# Patient Record
Sex: MALE | Race: BLACK OR AFRICAN AMERICAN | NOT HISPANIC OR LATINO | Employment: FULL TIME | URBAN - METROPOLITAN AREA
[De-identification: names, ages, dates, MRNs, and addresses within clinical notes are randomized per-mention and may not be internally consistent; named-entity substitution may affect disease eponyms.]

---

## 2023-03-02 PROBLEM — E29.1 HYPOGONADISM MALE: Status: ACTIVE | Noted: 2023-03-02

## 2023-03-02 PROBLEM — C61 CANCER OF PROSTATE (MULTI): Status: ACTIVE | Noted: 2023-03-02

## 2023-03-02 PROBLEM — R07.9 CHEST PAIN: Status: ACTIVE | Noted: 2023-03-02

## 2023-03-02 PROBLEM — H10.10 ALLERGIC CONJUNCTIVITIS: Status: ACTIVE | Noted: 2023-03-02

## 2023-03-02 PROBLEM — D17.30 LIPOMA OF SKIN AND SUBCUTANEOUS TISSUE: Status: ACTIVE | Noted: 2023-03-02

## 2023-03-02 PROBLEM — J30.1 HAY FEVER: Status: ACTIVE | Noted: 2023-03-02

## 2023-03-02 PROBLEM — R59.9 ENLARGEMENT OF LYMPH NODE: Status: ACTIVE | Noted: 2023-03-02

## 2023-03-02 PROBLEM — M54.50 LOWER BACK PAIN: Status: ACTIVE | Noted: 2023-03-02

## 2023-03-02 PROBLEM — N52.9 ED (ERECTILE DYSFUNCTION): Status: ACTIVE | Noted: 2023-03-02

## 2023-03-02 PROBLEM — H01.009 BLEPHARITIS: Status: ACTIVE | Noted: 2023-03-02

## 2023-03-02 PROBLEM — E55.9 VITAMIN D DEFICIENCY: Status: ACTIVE | Noted: 2023-03-02

## 2023-03-02 PROBLEM — J06.9 URTI (ACUTE UPPER RESPIRATORY INFECTION): Status: ACTIVE | Noted: 2023-03-02

## 2023-03-02 PROBLEM — J30.9 ALLERGIC RHINITIS: Status: ACTIVE | Noted: 2023-03-02

## 2023-03-02 PROBLEM — R39.13 INTERMITTENT URINARY STREAM: Status: ACTIVE | Noted: 2023-03-02

## 2023-03-02 PROBLEM — N40.1 BENIGN LOCALIZED PROSTATIC HYPERPLASIA WITH LOWER URINARY TRACT SYMPTOMS (LUTS): Status: ACTIVE | Noted: 2023-03-02

## 2023-03-02 PROBLEM — D12.6 TUBULAR ADENOMA OF COLON: Status: ACTIVE | Noted: 2023-03-02

## 2023-03-02 PROBLEM — I10 BENIGN ESSENTIAL HYPERTENSION: Status: ACTIVE | Noted: 2023-03-02

## 2023-03-02 PROBLEM — E78.5 HYPERLIPIDEMIA: Status: ACTIVE | Noted: 2023-03-02

## 2023-03-02 PROBLEM — N52.9 INABILITY TO ATTAIN ERECTION: Status: ACTIVE | Noted: 2023-03-02

## 2023-03-02 PROBLEM — K30 PERSISTENT INDIGESTION: Status: ACTIVE | Noted: 2023-03-02

## 2023-03-02 PROBLEM — E11.9 TYPE 2 DIABETES MELLITUS (MULTI): Status: ACTIVE | Noted: 2023-03-02

## 2023-03-02 PROBLEM — M54.9 ACUTE MIDLINE BACK PAIN: Status: ACTIVE | Noted: 2023-03-02

## 2023-03-02 PROBLEM — R35.0 INCREASED FREQUENCY OF URINATION: Status: ACTIVE | Noted: 2023-03-02

## 2023-03-02 RX ORDER — TAMSULOSIN HYDROCHLORIDE 0.4 MG/1
0.4 CAPSULE ORAL SEE ADMIN INSTRUCTIONS
COMMUNITY
Start: 2019-05-24 | End: 2023-11-29 | Stop reason: SDUPTHER

## 2023-03-02 RX ORDER — SILDENAFIL 100 MG/1
100 TABLET, FILM COATED ORAL AS NEEDED
COMMUNITY
Start: 2019-12-09 | End: 2024-03-18 | Stop reason: ALTCHOICE

## 2023-03-02 RX ORDER — LOSARTAN POTASSIUM 25 MG/1
1 TABLET ORAL DAILY
COMMUNITY
Start: 2018-08-23 | End: 2023-03-28

## 2023-03-02 RX ORDER — ASPIRIN 81 MG/1
1 TABLET ORAL DAILY
COMMUNITY
Start: 2018-09-27 | End: 2024-03-13 | Stop reason: SDUPTHER

## 2023-03-02 RX ORDER — METFORMIN HYDROCHLORIDE 500 MG/1
500 TABLET, EXTENDED RELEASE ORAL SEE ADMIN INSTRUCTIONS
COMMUNITY
Start: 2015-02-20 | End: 2023-11-29 | Stop reason: SDUPTHER

## 2023-03-02 RX ORDER — AZELASTINE 1 MG/ML
2 SPRAY, METERED NASAL 2 TIMES DAILY
COMMUNITY
Start: 2020-04-10 | End: 2023-11-29 | Stop reason: SDUPTHER

## 2023-03-02 RX ORDER — MINERAL OIL
1 ENEMA (ML) RECTAL DAILY PRN
COMMUNITY
Start: 2022-04-06 | End: 2023-11-29 | Stop reason: WASHOUT

## 2023-03-02 RX ORDER — SIMVASTATIN 20 MG/1
1 TABLET, FILM COATED ORAL DAILY
COMMUNITY
Start: 2012-02-29 | End: 2023-03-10

## 2023-03-02 RX ORDER — NAPROXEN 500 MG/1
500 TABLET ORAL
COMMUNITY
Start: 2013-08-22 | End: 2023-08-11 | Stop reason: SDUPTHER

## 2023-03-02 RX ORDER — CHOLECALCIFEROL (VITAMIN D3) 50 MCG
1 TABLET ORAL DAILY
COMMUNITY
Start: 2021-04-14 | End: 2023-10-23 | Stop reason: SDUPTHER

## 2023-03-09 LAB
PROSTATE SPECIFIC AG (NG/ML) IN SER/PLAS: 0.53 NG/ML (ref 0–4)
TESTOSTERONE (NG/DL) IN SER/PLAS: 178 NG/DL (ref 240–1000)

## 2023-03-10 DIAGNOSIS — E78.5 HYPERLIPIDEMIA, UNSPECIFIED HYPERLIPIDEMIA TYPE: Primary | ICD-10-CM

## 2023-03-10 RX ORDER — SIMVASTATIN 20 MG/1
TABLET, FILM COATED ORAL
Qty: 90 TABLET | Refills: 0 | Status: SHIPPED | OUTPATIENT
Start: 2023-03-10 | End: 2023-06-08

## 2023-03-28 DIAGNOSIS — I10 BENIGN ESSENTIAL HYPERTENSION: Primary | ICD-10-CM

## 2023-03-28 RX ORDER — LOSARTAN POTASSIUM 25 MG/1
TABLET ORAL
Qty: 90 TABLET | Refills: 3 | Status: SHIPPED | OUTPATIENT
Start: 2023-03-28 | End: 2023-11-29 | Stop reason: SDUPTHER

## 2023-04-28 ENCOUNTER — OFFICE VISIT (OUTPATIENT)
Dept: PRIMARY CARE | Facility: CLINIC | Age: 68
End: 2023-04-28
Payer: MEDICARE

## 2023-04-28 VITALS
WEIGHT: 285 LBS | BODY MASS INDEX: 37.64 KG/M2 | SYSTOLIC BLOOD PRESSURE: 122 MMHG | DIASTOLIC BLOOD PRESSURE: 80 MMHG | TEMPERATURE: 96.5 F

## 2023-04-28 DIAGNOSIS — E11.9 TYPE 2 DIABETES MELLITUS WITHOUT COMPLICATION, WITHOUT LONG-TERM CURRENT USE OF INSULIN (MULTI): ICD-10-CM

## 2023-04-28 DIAGNOSIS — K30 PERSISTENT INDIGESTION: ICD-10-CM

## 2023-04-28 DIAGNOSIS — Z00.00 ROUTINE GENERAL MEDICAL EXAMINATION AT HEALTH CARE FACILITY: Primary | ICD-10-CM

## 2023-04-28 DIAGNOSIS — I10 BENIGN ESSENTIAL HYPERTENSION: ICD-10-CM

## 2023-04-28 DIAGNOSIS — E55.9 VITAMIN D DEFICIENCY: ICD-10-CM

## 2023-04-28 PROCEDURE — 3079F DIAST BP 80-89 MM HG: CPT | Performed by: INTERNAL MEDICINE

## 2023-04-28 PROCEDURE — 1159F MED LIST DOCD IN RCRD: CPT | Performed by: INTERNAL MEDICINE

## 2023-04-28 PROCEDURE — 3074F SYST BP LT 130 MM HG: CPT | Performed by: INTERNAL MEDICINE

## 2023-04-28 PROCEDURE — 1160F RVW MEDS BY RX/DR IN RCRD: CPT | Performed by: INTERNAL MEDICINE

## 2023-04-28 PROCEDURE — 99214 OFFICE O/P EST MOD 30 MIN: CPT | Performed by: INTERNAL MEDICINE

## 2023-04-28 PROCEDURE — 1036F TOBACCO NON-USER: CPT | Performed by: INTERNAL MEDICINE

## 2023-04-28 PROCEDURE — G0439 PPPS, SUBSEQ VISIT: HCPCS | Performed by: INTERNAL MEDICINE

## 2023-04-28 PROCEDURE — 4010F ACE/ARB THERAPY RXD/TAKEN: CPT | Performed by: INTERNAL MEDICINE

## 2023-04-28 ASSESSMENT — PATIENT HEALTH QUESTIONNAIRE - PHQ9
1. LITTLE INTEREST OR PLEASURE IN DOING THINGS: NOT AT ALL
2. FEELING DOWN, DEPRESSED OR HOPELESS: NOT AT ALL
SUM OF ALL RESPONSES TO PHQ9 QUESTIONS 1 AND 2: 0

## 2023-04-28 ASSESSMENT — ENCOUNTER SYMPTOMS
CONSTITUTIONAL NEGATIVE: 1
GASTROINTESTINAL NEGATIVE: 1
RESPIRATORY NEGATIVE: 1
CARDIOVASCULAR NEGATIVE: 1

## 2023-04-28 NOTE — PROGRESS NOTES
Subjective   Reason for Visit: Brayden George is an 67 y.o. male here for a Medicare Wellness visit.   The patient is being seen for the subsequent annual wellness visit and follow up.  Past Medical, Surgical and Family History: reviewed and updated in chart.   Interval History: Patient has not been hospitalized previously.   Medications and Supplements: Review of all medications by a prescribing practitioner or clinical pharmacist (such as prescriptions, OTCs, herbal therapies and supplements) documented in the medical record.    No, the patient is not using opioids.   Health Risk Assessment:. Paper HRA completed by patient and scanned into chart.   Depression/Suicide Screening:  .   Done  No falls in the past year.  No recent hospitalizations.  Advance care planning completed.              HPI    Patient Care Team:  Darlene Booth MD as PCP - General  Darlene Booth MD as PCP - [employer] PCP     Review of Systems   Constitutional: Negative.    Respiratory: Negative.     Cardiovascular: Negative.    Gastrointestinal: Negative.        Objective   Vitals:  /80   Temp 35.8 °C (96.5 °F)   Wt 129 kg (285 lb)   BMI 37.64 kg/m²       Physical Exam  Constitutional:       Appearance: He is well-developed.   Cardiovascular:      Rate and Rhythm: Normal rate and regular rhythm.      Heart sounds: Normal heart sounds. No murmur heard.  Pulmonary:      Effort: Pulmonary effort is normal.      Breath sounds: Normal breath sounds.   Abdominal:      General: Bowel sounds are normal.      Palpations: Abdomen is soft.         Assessment/Plan   Problem List Items Addressed This Visit          Circulatory    Benign essential hypertension    Relevant Orders    CBC    Comprehensive Metabolic Panel    Lipid Panel    Hemoglobin A1C    TSH with reflex to Free T4 if abnormal    Vitamin D, Total    Albumin , Urine Random       Digestive    Persistent indigestion    Relevant Orders    CBC    Comprehensive  Metabolic Panel    Lipid Panel    Hemoglobin A1C    TSH with reflex to Free T4 if abnormal    Vitamin D, Total    Albumin , Urine Random       Endocrine/Metabolic    Type 2 diabetes mellitus (CMS/HCC)    Relevant Orders    CBC    Comprehensive Metabolic Panel    Lipid Panel    Hemoglobin A1C    TSH with reflex to Free T4 if abnormal    Vitamin D, Total    Albumin , Urine Random    Vitamin D deficiency    Relevant Orders    CBC    Comprehensive Metabolic Panel    Lipid Panel    Hemoglobin A1C    TSH with reflex to Free T4 if abnormal    Vitamin D, Total    Albumin , Urine Random     Other Visit Diagnoses       Routine general medical examination at health care facility    -  Primary             HTN.  Well controlled.  Continue with current medications.  Check BP at home daily.  Report to us if the numbers are higher than 130/80.     Diabetes Mellitus type II, under good  control.  1. Rx changes none  2. Education: Reviewed ‘ABCs’ of diabetes management (respective goals in parentheses):  A1C (<7), blood pressure (<130/80), and cholesterol (LDL <100).  3. Compliance at present is estimated to be good. Efforts to improve compliance were discussed.  4. Follow up in 3 months        HLD  Stable  Continue with statins  Check lipids  Lab Results   Component Value Date    WBC 4.5 10/14/2022    HGB 13.3 (L) 10/14/2022    HCT 38.5 (L) 10/14/2022     10/14/2022    CHOL 125 10/14/2022    TRIG 63 10/14/2022    HDL 53.2 10/14/2022    ALT 15 10/14/2022    AST 13 10/14/2022     10/14/2022    K 4.0 10/14/2022     10/14/2022    CREATININE 0.81 10/14/2022    BUN 10 10/14/2022    CO2 29 10/14/2022    TSH 2.05 04/13/2021    PSA 0.53 03/09/2023    HGBA1C 7.3 (A) 04/04/2022     par  MDM  1) COMPLEXITY: MORE THAN 1 STABLE CHRONIC CONDITION ADDRESSED OR 1 ACUTE ILLNESS ADDRESSED   2)DATA: TESTS INTERPRETED AND OR ORDERED, TOOK INDEPENDENT HISTORY OR RECORDS REVIEWED  3)RISK: MODERATE RISK DUE TO NATURE OF MEDICAL  CONDITIONS/COMORBIDITY OR MEDICATIONS ORDERED OR SURGICAL OR PROCEDURE REFERRAL

## 2023-05-15 ENCOUNTER — TELEMEDICINE (OUTPATIENT)
Dept: PRIMARY CARE | Facility: CLINIC | Age: 68
End: 2023-05-15
Payer: MEDICARE

## 2023-05-15 DIAGNOSIS — J32.9 SINUSITIS, UNSPECIFIED CHRONICITY, UNSPECIFIED LOCATION: Primary | ICD-10-CM

## 2023-05-15 PROCEDURE — 99213 OFFICE O/P EST LOW 20 MIN: CPT | Performed by: INTERNAL MEDICINE

## 2023-05-15 RX ORDER — AMOXICILLIN AND CLAVULANATE POTASSIUM 500; 125 MG/1; MG/1
500 TABLET, FILM COATED ORAL 2 TIMES DAILY
Qty: 20 TABLET | Refills: 0 | Status: SHIPPED | OUTPATIENT
Start: 2023-05-15 | End: 2023-05-25

## 2023-05-15 ASSESSMENT — ENCOUNTER SYMPTOMS
SORE THROAT: 1
SINUS PAIN: 1
DIARRHEA: 0
COUGH: 1

## 2023-05-15 NOTE — PROGRESS NOTES
Subjective   Patient ID: Brayden George is a 67 y.o. male who presents for URI.  URI   The current episode started in the past 7 days. The problem has been unchanged. There has been no fever. Associated symptoms include congestion, coughing, sinus pain and a sore throat. Pertinent negatives include no chest pain or diarrhea. He has tried acetaminophen and increased fluids for the symptoms. The treatment provided mild relief.       Review of Systems   HENT:  Positive for congestion, sinus pain and sore throat.    Respiratory:  Positive for cough.    Cardiovascular:  Negative for chest pain.   Gastrointestinal:  Negative for diarrhea.       Objective   Physical Exam  Neurological:      Mental Status: He is alert.   Psychiatric:         Mood and Affect: Mood normal.         Assessment/Plan   Problem List Items Addressed This Visit          Infectious/Inflammatory    Sinusitis - Primary          Acute sinusitis.  Symptoms persist for longer than 1 week.  Has purulent nasal discharge and other signs of acute bacterial sinusitis.  Recommend antibiotics, saline sinus rinses, PRN Ibuprofen and Tylenol.  Let us know if not better in 1 week.  An interactive audio and video telecommunication system which permits real time communications between the patient (at the originating site) and provider (at the distant site) was utilized to provide this telehealth service.    Verbal consent was requested and obtained from the patient on the day of encounter.      Darlene Booth MD

## 2023-06-08 DIAGNOSIS — E78.5 HYPERLIPIDEMIA, UNSPECIFIED HYPERLIPIDEMIA TYPE: ICD-10-CM

## 2023-06-08 RX ORDER — SIMVASTATIN 20 MG/1
TABLET, FILM COATED ORAL
Qty: 90 TABLET | Refills: 3 | Status: SHIPPED | OUTPATIENT
Start: 2023-06-08 | End: 2023-11-29 | Stop reason: SDUPTHER

## 2023-06-28 ENCOUNTER — OFFICE VISIT (OUTPATIENT)
Dept: PRIMARY CARE | Facility: CLINIC | Age: 68
End: 2023-06-28
Payer: MEDICARE

## 2023-06-28 VITALS
HEIGHT: 73 IN | WEIGHT: 287 LBS | SYSTOLIC BLOOD PRESSURE: 120 MMHG | BODY MASS INDEX: 38.04 KG/M2 | DIASTOLIC BLOOD PRESSURE: 60 MMHG

## 2023-06-28 DIAGNOSIS — M54.2 NECK PAIN ON LEFT SIDE: Primary | ICD-10-CM

## 2023-06-28 PROCEDURE — 3078F DIAST BP <80 MM HG: CPT

## 2023-06-28 PROCEDURE — 99213 OFFICE O/P EST LOW 20 MIN: CPT

## 2023-06-28 PROCEDURE — 3074F SYST BP LT 130 MM HG: CPT

## 2023-06-28 PROCEDURE — 1036F TOBACCO NON-USER: CPT

## 2023-06-28 PROCEDURE — 1160F RVW MEDS BY RX/DR IN RCRD: CPT

## 2023-06-28 PROCEDURE — 4010F ACE/ARB THERAPY RXD/TAKEN: CPT

## 2023-06-28 PROCEDURE — 1159F MED LIST DOCD IN RCRD: CPT

## 2023-06-28 RX ORDER — OMEGA-3-ACID ETHYL ESTERS 1 G/1
2 CAPSULE, LIQUID FILLED ORAL 2 TIMES DAILY
COMMUNITY
Start: 2012-02-29 | End: 2023-11-29 | Stop reason: SDUPTHER

## 2023-06-28 RX ORDER — CLINDAMYCIN HYDROCHLORIDE 300 MG/1
300 CAPSULE ORAL 3 TIMES DAILY
Qty: 21 CAPSULE | Refills: 0 | Status: SHIPPED | OUTPATIENT
Start: 2023-06-28 | End: 2023-07-05

## 2023-06-28 RX ORDER — DICLOFENAC SODIUM 10 MG/G
4 GEL TOPICAL DAILY
COMMUNITY
Start: 2022-10-14 | End: 2023-11-29 | Stop reason: SDUPTHER

## 2023-06-28 ASSESSMENT — PATIENT HEALTH QUESTIONNAIRE - PHQ9
1. LITTLE INTEREST OR PLEASURE IN DOING THINGS: NOT AT ALL
SUM OF ALL RESPONSES TO PHQ9 QUESTIONS 1 AND 2: 0
2. FEELING DOWN, DEPRESSED OR HOPELESS: NOT AT ALL

## 2023-06-28 ASSESSMENT — LIFESTYLE VARIABLES
HOW OFTEN DO YOU HAVE SIX OR MORE DRINKS ON ONE OCCASION: NEVER
HOW OFTEN DO YOU HAVE A DRINK CONTAINING ALCOHOL: MONTHLY OR LESS

## 2023-06-28 NOTE — PROGRESS NOTES
"Subjective   Patient ID: Brayden George is a 67 y.o. male who presents for ENT ref requested (Seen [facility] 2 weeks ago for given antibiotic).  HPI  65yo M with pmhx of HTN, DMII, prostate cancer s/p ADT and radiation who presents today for ENT referral.     Reports that he went to urgent care 2 weeks ago for sore throat, negative strep and COVID test, was given rx for augmentin, which he took the full course of. Symptoms improved but continues to have pain in the left side of his neck. Worse at night. Not worse with swallowing. Worse with moving jaw.     Has had many dental problems, has not seen a dentist in many years.     No chest pain, SOB, dizziness, nasal congestion, or cough.     All systems have been reviewed and are negative for complaint other than those mentioned in the HPI.     Objective   /60 (BP Location: Left arm, Patient Position: Sitting, BP Cuff Size: Large adult)   Ht 1.854 m (6' 1\")   Wt 130 kg (287 lb)   BMI 37.87 kg/m²    Physical Exam  Constitutional:       General: He is awake.      Appearance: Normal appearance.   HENT:      Head: Normocephalic and atraumatic.   Eyes:      Extraocular Movements: Extraocular movements intact.      Pupils: Pupils are equal, round, and reactive to light.   Cardiovascular:      Rate and Rhythm: Normal rate and regular rhythm.      Heart sounds: S1 normal and S2 normal. No murmur heard.  Pulmonary:      Effort: Pulmonary effort is normal.      Breath sounds: Normal breath sounds.   Musculoskeletal:      Cervical back: Normal range of motion and neck supple.      Right lower leg: No edema.      Left lower leg: No edema.   Skin:     General: Skin is warm and dry.   Neurological:      General: No focal deficit present.      Mental Status: He is alert and oriented to person, place, and time.   Psychiatric:         Mood and Affect: Mood and affect normal.         Behavior: Behavior normal. Behavior is cooperative.         Thought Content: Thought content normal.    "      Judgment: Judgment normal.     Brayden was seen today for ent ref requested.  Diagnoses and all orders for this visit:  Neck pain on left side (Primary)  -     Visible dental carries on teeth of left lower jaw.   - Pain possibly due to dental carries, recommend follow up with dentist. Will treat with clindamycin as well in interim.   - Inflamed lymph node palpated on left subclavicular. Likely in setting of infection, if it does not resolve, recommend follow up for ultrasound.   - Patient requesting referral to ENT, provided. Referral to ENT; Future  -     clindamycin (Cleocin) 300 mg capsule; Take 1 capsule (300 mg) by mouth 3 times a day for 7 days.    Follow up as regularly scheduled for chronic care conditions with PCP.

## 2023-08-10 ENCOUNTER — OFFICE VISIT (OUTPATIENT)
Dept: PRIMARY CARE | Facility: CLINIC | Age: 68
End: 2023-08-10
Payer: MEDICARE

## 2023-08-10 ENCOUNTER — LAB (OUTPATIENT)
Dept: LAB | Facility: LAB | Age: 68
End: 2023-08-10
Payer: MEDICARE

## 2023-08-10 VITALS — WEIGHT: 287 LBS | DIASTOLIC BLOOD PRESSURE: 80 MMHG | SYSTOLIC BLOOD PRESSURE: 120 MMHG | BODY MASS INDEX: 37.87 KG/M2

## 2023-08-10 DIAGNOSIS — E78.5 HYPERLIPIDEMIA, UNSPECIFIED HYPERLIPIDEMIA TYPE: ICD-10-CM

## 2023-08-10 DIAGNOSIS — C61 CANCER OF PROSTATE (MULTI): ICD-10-CM

## 2023-08-10 DIAGNOSIS — E55.9 VITAMIN D DEFICIENCY: ICD-10-CM

## 2023-08-10 DIAGNOSIS — I10 BENIGN ESSENTIAL HYPERTENSION: Primary | ICD-10-CM

## 2023-08-10 DIAGNOSIS — E11.9 TYPE 2 DIABETES MELLITUS WITHOUT COMPLICATION, WITHOUT LONG-TERM CURRENT USE OF INSULIN (MULTI): ICD-10-CM

## 2023-08-10 DIAGNOSIS — I10 BENIGN ESSENTIAL HYPERTENSION: ICD-10-CM

## 2023-08-10 DIAGNOSIS — Z00.00 ANNUAL PHYSICAL EXAM: ICD-10-CM

## 2023-08-10 LAB
ALANINE AMINOTRANSFERASE (SGPT) (U/L) IN SER/PLAS: 18 U/L (ref 10–52)
ALBUMIN (G/DL) IN SER/PLAS: 4.2 G/DL (ref 3.4–5)
ALBUMIN (MG/L) IN URINE: <7 MG/L
ALBUMIN/CREATININE (UG/MG) IN URINE: NORMAL UG/MG CRT (ref 0–30)
ALKALINE PHOSPHATASE (U/L) IN SER/PLAS: 63 U/L (ref 33–136)
ANION GAP IN SER/PLAS: 12 MMOL/L (ref 10–20)
ASPARTATE AMINOTRANSFERASE (SGOT) (U/L) IN SER/PLAS: 16 U/L (ref 9–39)
BILIRUBIN TOTAL (MG/DL) IN SER/PLAS: 0.4 MG/DL (ref 0–1.2)
CALCIDIOL (25 OH VITAMIN D3) (NG/ML) IN SER/PLAS: 28 NG/ML
CALCIUM (MG/DL) IN SER/PLAS: 9.6 MG/DL (ref 8.6–10.6)
CARBON DIOXIDE, TOTAL (MMOL/L) IN SER/PLAS: 28 MMOL/L (ref 21–32)
CHLORIDE (MMOL/L) IN SER/PLAS: 105 MMOL/L (ref 98–107)
CHOLESTEROL (MG/DL) IN SER/PLAS: 142 MG/DL (ref 0–199)
CHOLESTEROL IN HDL (MG/DL) IN SER/PLAS: 57.4 MG/DL
CHOLESTEROL/HDL RATIO: 2.5
CREATININE (MG/DL) IN SER/PLAS: 0.89 MG/DL (ref 0.5–1.3)
CREATININE (MG/DL) IN URINE: 109 MG/DL (ref 20–370)
ERYTHROCYTE DISTRIBUTION WIDTH (RATIO) BY AUTOMATED COUNT: 12.9 % (ref 11.5–14.5)
ERYTHROCYTE MEAN CORPUSCULAR HEMOGLOBIN CONCENTRATION (G/DL) BY AUTOMATED: 33.2 G/DL (ref 32–36)
ERYTHROCYTE MEAN CORPUSCULAR VOLUME (FL) BY AUTOMATED COUNT: 88 FL (ref 80–100)
ERYTHROCYTES (10*6/UL) IN BLOOD BY AUTOMATED COUNT: 4.43 X10E12/L (ref 4.5–5.9)
ESTIMATED AVERAGE GLUCOSE FOR HBA1C: 151 MG/DL
GFR MALE: >90 ML/MIN/1.73M2
GLUCOSE (MG/DL) IN SER/PLAS: 138 MG/DL (ref 74–99)
HEMATOCRIT (%) IN BLOOD BY AUTOMATED COUNT: 38.8 % (ref 41–52)
HEMOGLOBIN (G/DL) IN BLOOD: 12.9 G/DL (ref 13.5–17.5)
HEMOGLOBIN A1C/HEMOGLOBIN TOTAL IN BLOOD: 6.9 %
HEPATITIS C VIRUS AB PRESENCE IN SERUM: NONREACTIVE
LDL: 67 MG/DL (ref 0–99)
LEUKOCYTES (10*3/UL) IN BLOOD BY AUTOMATED COUNT: 4.6 X10E9/L (ref 4.4–11.3)
NRBC (PER 100 WBCS) BY AUTOMATED COUNT: 0 /100 WBC (ref 0–0)
PLATELETS (10*3/UL) IN BLOOD AUTOMATED COUNT: 162 X10E9/L (ref 150–450)
POTASSIUM (MMOL/L) IN SER/PLAS: 3.9 MMOL/L (ref 3.5–5.3)
PROTEIN TOTAL: 7.1 G/DL (ref 6.4–8.2)
SODIUM (MMOL/L) IN SER/PLAS: 141 MMOL/L (ref 136–145)
THYROTROPIN (MIU/L) IN SER/PLAS BY DETECTION LIMIT <= 0.05 MIU/L: 1.77 MIU/L (ref 0.44–3.98)
TRIGLYCERIDE (MG/DL) IN SER/PLAS: 88 MG/DL (ref 0–149)
UREA NITROGEN (MG/DL) IN SER/PLAS: 12 MG/DL (ref 6–23)
VLDL: 18 MG/DL (ref 0–40)

## 2023-08-10 PROCEDURE — 82043 UR ALBUMIN QUANTITATIVE: CPT

## 2023-08-10 PROCEDURE — 3074F SYST BP LT 130 MM HG: CPT | Performed by: INTERNAL MEDICINE

## 2023-08-10 PROCEDURE — 86803 HEPATITIS C AB TEST: CPT

## 2023-08-10 PROCEDURE — 80061 LIPID PANEL: CPT

## 2023-08-10 PROCEDURE — 83036 HEMOGLOBIN GLYCOSYLATED A1C: CPT

## 2023-08-10 PROCEDURE — 36415 COLL VENOUS BLD VENIPUNCTURE: CPT

## 2023-08-10 PROCEDURE — 82306 VITAMIN D 25 HYDROXY: CPT

## 2023-08-10 PROCEDURE — 80053 COMPREHEN METABOLIC PANEL: CPT

## 2023-08-10 PROCEDURE — 1159F MED LIST DOCD IN RCRD: CPT | Performed by: INTERNAL MEDICINE

## 2023-08-10 PROCEDURE — 1160F RVW MEDS BY RX/DR IN RCRD: CPT | Performed by: INTERNAL MEDICINE

## 2023-08-10 PROCEDURE — 84443 ASSAY THYROID STIM HORMONE: CPT

## 2023-08-10 PROCEDURE — 85027 COMPLETE CBC AUTOMATED: CPT

## 2023-08-10 PROCEDURE — 99214 OFFICE O/P EST MOD 30 MIN: CPT | Performed by: INTERNAL MEDICINE

## 2023-08-10 PROCEDURE — 1126F AMNT PAIN NOTED NONE PRSNT: CPT | Performed by: INTERNAL MEDICINE

## 2023-08-10 PROCEDURE — 82570 ASSAY OF URINE CREATININE: CPT

## 2023-08-10 PROCEDURE — 3079F DIAST BP 80-89 MM HG: CPT | Performed by: INTERNAL MEDICINE

## 2023-08-10 PROCEDURE — 1036F TOBACCO NON-USER: CPT | Performed by: INTERNAL MEDICINE

## 2023-08-10 PROCEDURE — 3008F BODY MASS INDEX DOCD: CPT | Performed by: INTERNAL MEDICINE

## 2023-08-10 PROCEDURE — 4010F ACE/ARB THERAPY RXD/TAKEN: CPT | Performed by: INTERNAL MEDICINE

## 2023-08-10 ASSESSMENT — ENCOUNTER SYMPTOMS
CONSTITUTIONAL NEGATIVE: 1
CARDIOVASCULAR NEGATIVE: 1
RESPIRATORY NEGATIVE: 1
GASTROINTESTINAL NEGATIVE: 1

## 2023-08-10 NOTE — PROGRESS NOTES
Subjective   Patient ID: Brayden George is a 67 y.o. male who presents for Follow-up.  HPI    Review of Systems   Constitutional: Negative.    Respiratory: Negative.     Cardiovascular: Negative.    Gastrointestinal: Negative.        Objective   Physical Exam  Neurological:      Mental Status: He is alert.   Psychiatric:         Mood and Affect: Mood normal.         Assessment/Plan   Problem List Items Addressed This Visit       Type 2 diabetes mellitus (CMS/HCC)    Relevant Orders    CBC    Comprehensive Metabolic Panel    TSH with reflex to Free T4 if abnormal    Hemoglobin A1C    Lipid Panel    Vitamin D, Total    Albumin, urine, random    Benign essential hypertension - Primary    Relevant Orders    CBC    Comprehensive Metabolic Panel    TSH with reflex to Free T4 if abnormal    Hemoglobin A1C    Lipid Panel    Vitamin D, Total    Albumin, urine, random    Hyperlipidemia    Relevant Orders    CBC    Comprehensive Metabolic Panel    TSH with reflex to Free T4 if abnormal    Hemoglobin A1C    Lipid Panel    Vitamin D, Total    Albumin, urine, random    Vitamin D deficiency    Relevant Orders    CBC    Comprehensive Metabolic Panel    TSH with reflex to Free T4 if abnormal    Hemoglobin A1C    Lipid Panel    Vitamin D, Total    Albumin, urine, random     Other Visit Diagnoses       Annual physical exam        Relevant Orders    Hepatitis C antibody          The patient is here for a follow up on chronic medical problems.  His medications were reviewed, pharmacy updated, notes reviewed, prior labs reviewed.       HTN.  Well controlled.  Continue with current medications.  Check BP at home daily.  Report to us if the numbers are higher than 130/80.       Diabetes Mellitus type II, under good  control.  1. Rx changes none  2. Education: Reviewed ‘ABCs’ of diabetes management (respective goals in parentheses):  A1C (<7), blood pressure (<130/80), and cholesterol (LDL <100).  3. Compliance at present is estimated to be  good. Efforts to improve compliance were discussed.  4. Follow up in 3 months    I would like to start him on GLP receptor agonists  Due for repeat BW today  After that we will decide which one to start    Due for an eye exam        HLD  Stable  Continue with statins  Check lipids           Darlene Booth MD

## 2023-08-11 DIAGNOSIS — E55.9 VITAMIN D DEFICIENCY: Primary | ICD-10-CM

## 2023-08-11 DIAGNOSIS — E11.9 TYPE 2 DIABETES MELLITUS WITHOUT COMPLICATION, WITHOUT LONG-TERM CURRENT USE OF INSULIN (MULTI): ICD-10-CM

## 2023-08-11 DIAGNOSIS — M54.40 ACUTE RIGHT-SIDED LOW BACK PAIN WITH SCIATICA, SCIATICA LATERALITY UNSPECIFIED: ICD-10-CM

## 2023-08-11 RX ORDER — NAPROXEN 500 MG/1
500 TABLET ORAL
Qty: 90 TABLET | Refills: 0 | Status: SHIPPED | OUTPATIENT
Start: 2023-08-11

## 2023-08-11 RX ORDER — DULAGLUTIDE 0.75 MG/.5ML
0.75 INJECTION, SOLUTION SUBCUTANEOUS
Qty: 4 EACH | Refills: 0 | Status: SHIPPED | OUTPATIENT
Start: 2023-08-11 | End: 2023-11-29 | Stop reason: WASHOUT

## 2023-08-11 RX ORDER — ACETAMINOPHEN 500 MG
50 TABLET ORAL DAILY
Qty: 90 CAPSULE | Refills: 0 | Status: SHIPPED | OUTPATIENT
Start: 2023-08-11 | End: 2023-10-23 | Stop reason: SDUPTHER

## 2023-08-11 RX ORDER — DULAGLUTIDE 1.5 MG/.5ML
1.5 INJECTION, SOLUTION SUBCUTANEOUS
Qty: 4 EACH | Refills: 2 | Status: SHIPPED | OUTPATIENT
Start: 2023-08-11 | End: 2023-11-29 | Stop reason: WASHOUT

## 2023-09-14 LAB
APPEARANCE, URINE: CLEAR
BILIRUBIN, URINE: NEGATIVE
BLOOD, URINE: NEGATIVE
COLOR, URINE: YELLOW
GLUCOSE, URINE: NEGATIVE MG/DL
KETONES, URINE: NEGATIVE MG/DL
LEUKOCYTE ESTERASE, URINE: NEGATIVE
NITRITE, URINE: NEGATIVE
PH, URINE: 5 (ref 5–8)
PROSTATE SPECIFIC AG (NG/ML) IN SER/PLAS: 0.35 NG/ML (ref 0–4)
PROTEIN, URINE: NEGATIVE MG/DL
SPECIFIC GRAVITY, URINE: 1.01 (ref 1–1.03)
TESTOSTERONE (NG/DL) IN SER/PLAS: 149 NG/DL (ref 240–1000)
UROBILINOGEN, URINE: <2 MG/DL (ref 0–1.9)

## 2023-10-23 DIAGNOSIS — E55.9 VITAMIN D DEFICIENCY: ICD-10-CM

## 2023-10-23 RX ORDER — ACETAMINOPHEN 500 MG
2000 TABLET ORAL DAILY
Qty: 90 CAPSULE | Refills: 0 | Status: SHIPPED | OUTPATIENT
Start: 2023-10-23 | End: 2023-10-30 | Stop reason: SDUPTHER

## 2023-10-30 DIAGNOSIS — E55.9 VITAMIN D DEFICIENCY: ICD-10-CM

## 2023-10-30 RX ORDER — ACETAMINOPHEN 500 MG
2000 TABLET ORAL DAILY
Qty: 90 CAPSULE | Refills: 1 | Status: SHIPPED | OUTPATIENT
Start: 2023-10-30 | End: 2023-11-29 | Stop reason: SDUPTHER

## 2023-11-10 ENCOUNTER — APPOINTMENT (OUTPATIENT)
Dept: PRIMARY CARE | Facility: CLINIC | Age: 68
End: 2023-11-10
Payer: MEDICARE

## 2023-11-29 ENCOUNTER — OFFICE VISIT (OUTPATIENT)
Dept: PRIMARY CARE | Facility: CLINIC | Age: 68
End: 2023-11-29
Payer: MEDICARE

## 2023-11-29 VITALS — SYSTOLIC BLOOD PRESSURE: 128 MMHG | WEIGHT: 283 LBS | BODY MASS INDEX: 37.34 KG/M2 | DIASTOLIC BLOOD PRESSURE: 74 MMHG

## 2023-11-29 DIAGNOSIS — E78.5 HYPERLIPIDEMIA, UNSPECIFIED HYPERLIPIDEMIA TYPE: ICD-10-CM

## 2023-11-29 DIAGNOSIS — Z23 FLU VACCINE NEED: ICD-10-CM

## 2023-11-29 DIAGNOSIS — E55.9 VITAMIN D DEFICIENCY: ICD-10-CM

## 2023-11-29 DIAGNOSIS — I10 BENIGN ESSENTIAL HYPERTENSION: ICD-10-CM

## 2023-11-29 DIAGNOSIS — T78.40XD ALLERGY, SUBSEQUENT ENCOUNTER: ICD-10-CM

## 2023-11-29 DIAGNOSIS — B35.1 ONYCHOMYCOSIS: Primary | ICD-10-CM

## 2023-11-29 DIAGNOSIS — E11.9 TYPE 2 DIABETES MELLITUS WITHOUT COMPLICATION, WITHOUT LONG-TERM CURRENT USE OF INSULIN (MULTI): ICD-10-CM

## 2023-11-29 DIAGNOSIS — R39.11 URINARY HESITANCY: ICD-10-CM

## 2023-11-29 LAB — POC HEMOGLOBIN A1C: 7.2 % (ref 4.2–6.5)

## 2023-11-29 PROCEDURE — 3078F DIAST BP <80 MM HG: CPT | Performed by: INTERNAL MEDICINE

## 2023-11-29 PROCEDURE — G0008 ADMIN INFLUENZA VIRUS VAC: HCPCS | Performed by: INTERNAL MEDICINE

## 2023-11-29 PROCEDURE — 1160F RVW MEDS BY RX/DR IN RCRD: CPT | Performed by: INTERNAL MEDICINE

## 2023-11-29 PROCEDURE — 1126F AMNT PAIN NOTED NONE PRSNT: CPT | Performed by: INTERNAL MEDICINE

## 2023-11-29 PROCEDURE — 1159F MED LIST DOCD IN RCRD: CPT | Performed by: INTERNAL MEDICINE

## 2023-11-29 PROCEDURE — 4010F ACE/ARB THERAPY RXD/TAKEN: CPT | Performed by: INTERNAL MEDICINE

## 2023-11-29 PROCEDURE — 1036F TOBACCO NON-USER: CPT | Performed by: INTERNAL MEDICINE

## 2023-11-29 PROCEDURE — 3044F HG A1C LEVEL LT 7.0%: CPT | Performed by: INTERNAL MEDICINE

## 2023-11-29 PROCEDURE — 83036 HEMOGLOBIN GLYCOSYLATED A1C: CPT | Performed by: INTERNAL MEDICINE

## 2023-11-29 PROCEDURE — 90662 IIV NO PRSV INCREASED AG IM: CPT | Performed by: INTERNAL MEDICINE

## 2023-11-29 PROCEDURE — 99214 OFFICE O/P EST MOD 30 MIN: CPT | Performed by: INTERNAL MEDICINE

## 2023-11-29 PROCEDURE — 3074F SYST BP LT 130 MM HG: CPT | Performed by: INTERNAL MEDICINE

## 2023-11-29 PROCEDURE — 3008F BODY MASS INDEX DOCD: CPT | Performed by: INTERNAL MEDICINE

## 2023-11-29 RX ORDER — DICLOFENAC SODIUM 10 MG/G
4 GEL TOPICAL DAILY
Qty: 120 G | Refills: 0 | Status: SHIPPED | OUTPATIENT
Start: 2023-11-29 | End: 2023-12-29

## 2023-11-29 RX ORDER — LOSARTAN POTASSIUM 25 MG/1
25 TABLET ORAL DAILY
Qty: 90 TABLET | Refills: 0 | Status: SHIPPED | OUTPATIENT
Start: 2023-11-29 | End: 2024-01-31 | Stop reason: SDUPTHER

## 2023-11-29 RX ORDER — ACETAMINOPHEN 500 MG
2000 TABLET ORAL DAILY
Qty: 90 CAPSULE | Refills: 1 | Status: SHIPPED | OUTPATIENT
Start: 2023-11-29 | End: 2024-03-13 | Stop reason: SDUPTHER

## 2023-11-29 RX ORDER — SIMVASTATIN 20 MG/1
20 TABLET, FILM COATED ORAL DAILY
Qty: 90 TABLET | Refills: 3 | Status: SHIPPED | OUTPATIENT
Start: 2023-11-29 | End: 2024-03-13 | Stop reason: SDUPTHER

## 2023-11-29 RX ORDER — OMEGA-3-ACID ETHYL ESTERS 1 G/1
2 CAPSULE, LIQUID FILLED ORAL 2 TIMES DAILY
Qty: 120 CAPSULE | Refills: 0 | Status: SHIPPED | OUTPATIENT
Start: 2023-11-29 | End: 2024-01-22

## 2023-11-29 RX ORDER — METFORMIN HYDROCHLORIDE 500 MG/1
1000 TABLET, EXTENDED RELEASE ORAL SEE ADMIN INSTRUCTIONS
Qty: 90 TABLET | Refills: 0 | Status: SHIPPED | OUTPATIENT
Start: 2023-11-29 | End: 2024-03-13 | Stop reason: SDUPTHER

## 2023-11-29 RX ORDER — CICLOPIROX 80 MG/ML
SOLUTION TOPICAL NIGHTLY
Qty: 10 ML | Refills: 0 | Status: SHIPPED | OUTPATIENT
Start: 2023-11-29 | End: 2023-12-09

## 2023-11-29 RX ORDER — AZELASTINE 1 MG/ML
2 SPRAY, METERED NASAL 2 TIMES DAILY
Qty: 72 ML | Refills: 0 | Status: SHIPPED | OUTPATIENT
Start: 2023-11-29 | End: 2024-03-14

## 2023-11-29 RX ORDER — TAMSULOSIN HYDROCHLORIDE 0.4 MG/1
0.4 CAPSULE ORAL SEE ADMIN INSTRUCTIONS
Qty: 90 CAPSULE | Refills: 0 | Status: SHIPPED | OUTPATIENT
Start: 2023-11-29 | End: 2024-02-27

## 2023-11-29 ASSESSMENT — ENCOUNTER SYMPTOMS
CONSTITUTIONAL NEGATIVE: 1
CARDIOVASCULAR NEGATIVE: 1
GASTROINTESTINAL NEGATIVE: 1
RESPIRATORY NEGATIVE: 1

## 2023-11-29 NOTE — PROGRESS NOTES
Chief Complaint:   Chief Complaint   Patient presents with    Follow-up    Med Refill      HPI    Brayden George is a 68 y.o. year old male who presents to the clinic for a follow up visit and medication refills. He states that he has been dealing with some issues with his feet. He has noticed some significant discoloration of his toes. He has not noticed any significant changes in the sensation of his feet but has been dealing with the feet issues for about a month now. Outside of this he has been overall doing well.     ROS   Review of Systems   Constitutional: Negative.    Respiratory: Negative.     Cardiovascular: Negative.    Gastrointestinal: Negative.         Objective   Vitals:    11/29/23 1012   BP: 128/74        BMI Readings from Last 15 Encounters:   11/29/23 37.34 kg/m²   08/10/23 37.87 kg/m²   06/28/23 37.87 kg/m²   04/28/23 37.64 kg/m²   01/27/23 38.04 kg/m²   10/14/22 38.57 kg/m²   09/13/22 37.94 kg/m²   07/15/22 38.70 kg/m²   04/04/22 39.62 kg/m²   03/22/22 39.95 kg/m²   01/03/22 39.37 kg/m²   10/12/21 39.43 kg/m²   10/01/21 38.84 kg/m²   07/13/21 39.41 kg/m²   07/01/21 39.50 kg/m²        Physical Exam  Physical Exam  Cardiovascular:      Rate and Rhythm: Normal rate and regular rhythm.      Pulses: Normal pulses.      Heart sounds: Normal heart sounds.   Pulmonary:      Effort: Pulmonary effort is normal.      Breath sounds: Normal breath sounds.   Abdominal:      General: Abdomen is flat. Bowel sounds are normal.      Palpations: Abdomen is soft.   Skin:     Comments: Significant darkening of toenails, worse on the right side.    Neurological:      Mental Status: He is alert.          Labs:  Lab Results   Component Value Date    WBC 4.6 08/10/2023    HGB 12.9 (L) 08/10/2023    HCT 38.8 (L) 08/10/2023    MCV 88 08/10/2023     08/10/2023     Lab Results   Component Value Date    GLUCOSE 138 (H) 08/10/2023    CALCIUM 9.6 08/10/2023     08/10/2023    K 3.9 08/10/2023    CO2 28 08/10/2023     " 08/10/2023    BUN 12 08/10/2023    CREATININE 0.89 08/10/2023         No results found for: \"ALBUR\", \"[id number]\"  Lab Results   Component Value Date    HGBA1C 6.9 (A) 08/10/2023      Lab Results   Component Value Date    HGBA1C 6.9 (A) 08/10/2023    HGBA1C 7.3 (A) 04/04/2022    HGBA1C 7.4 (A) 01/03/2022     Lab Results   Component Value Date    TSH 1.77 08/10/2023     Immunizations:  Immunization History   Administered Date(s) Administered    Flu vaccine (IIV4), preservative free *Check age/dose* 11/30/2015, 10/09/2018    Flu vaccine, quadrivalent, high-dose, preservative free, age 65y+ (FLUZONE) 10/14/2022    Influenza, High Dose Seasonal, Preservative Free 12/09/2019, 10/12/2020    Influenza, Unspecified 12/06/2001, 11/30/2010, 11/30/2011, 08/29/2012, 02/27/2013, 11/20/2013, 11/21/2014, 11/16/2016, 01/17/2018    Pfizer Purple Cap SARS-CoV-2 09/23/2021    Pneumococcal conjugate vaccine, 13-valent (PREVNAR 13) 08/14/2017    Pneumococcal polysaccharide vaccine, 23-valent, age 2 years and older (PNEUMOVAX 23) 05/26/2010    Tdap vaccine, age 7 year and older (BOOSTRIX) 06/26/2005, 04/04/2022       Current Medications:  Current Outpatient Medications   Medication Sig Dispense Refill    aspirin 81 mg EC tablet Take 1 tablet (81 mg) by mouth once daily.      azelastine (Astelin) 137 mcg (0.1 %) nasal spray Administer 2 sprays into each nostril 2 times a day. 72 mL 0    cholecalciferol (Vitamin D3) 50 mcg (2,000 unit) capsule Take 1 capsule (50 mcg) by mouth once daily. 90 capsule 1    ciclopirox (Penlac) 8 % solution Apply topically once daily at bedtime for 10 days. 10 mL 0    diclofenac sodium (Voltaren) 1 % gel gel Apply 1 Application topically once daily. to affected area 120 g 0    losartan (Cozaar) 25 mg tablet Take 1 tablet (25 mg) by mouth once daily. 90 tablet 0    metFORMIN  mg 24 hr tablet Take 2 tablets (1,000 mg) by mouth see administration instructions. 90 tablet 0    naproxen (Naprosyn) 500 mg " tablet Take 1 tablet (500 mg) by mouth once daily. 90 tablet 0    omega-3 acid ethyl esters (Lovaza) 1 gram capsule Take 2 capsules (2 g) by mouth 2 times a day. 120 capsule 0    sildenafil (Viagra) 100 mg tablet Take 1 tablet (100 mg) by mouth if needed for erectile dysfunction.      simvastatin (Zocor) 20 mg tablet Take 1 tablet (20 mg) by mouth once daily. 90 tablet 3    tamsulosin (Flomax) 0.4 mg 24 hr capsule Take 1 capsule (0.4 mg) by mouth see administration instructions. 2 cap daily 1/2 hour after same meal or bedtime 90 capsule 0     No current facility-administered medications for this visit.       Assessment and Plan  Brayden was seen today for follow-up and med refill.  Diagnoses and all orders for this visit:  Onychomycosis (Primary)  -     diclofenac sodium (Voltaren) 1 % gel gel; Apply 1 Application topically once daily. to affected area  -     ciclopirox (Penlac) 8 % solution; Apply topically once daily at bedtime for 10 days.  -     Referral to Podiatry; Future  Vitamin D deficiency  -     cholecalciferol (Vitamin D3) 50 mcg (2,000 unit) capsule; Take 1 capsule (50 mcg) by mouth once daily.  Hyperlipidemia, unspecified hyperlipidemia type  -     omega-3 acid ethyl esters (Lovaza) 1 gram capsule; Take 2 capsules (2 g) by mouth 2 times a day.  -     simvastatin (Zocor) 20 mg tablet; Take 1 tablet (20 mg) by mouth once daily.  Allergy, subsequent encounter  -     azelastine (Astelin) 137 mcg (0.1 %) nasal spray; Administer 2 sprays into each nostril 2 times a day.  Urinary hesitancy  -     tamsulosin (Flomax) 0.4 mg 24 hr capsule; Take 1 capsule (0.4 mg) by mouth see administration instructions. 2 cap daily 1/2 hour after same meal or bedtime  Benign essential hypertension  -     losartan (Cozaar) 25 mg tablet; Take 1 tablet (25 mg) by mouth once daily.  Type 2 diabetes mellitus without complication, without long-term current use of insulin (CMS/[facility])  -     metFORMIN  mg 24 hr tablet; Take 2  tablets (1,000 mg) by mouth see administration instructions.   # HTN  - Current Regimen: Losartan 25 mg every day  - Urine Albumin (8/2023): 109  - Cr/GFR (8/2023): 0.89/ >90    # Toe Nail Blackening  2/2 Onychomycosis  - Penlac prescription sent  [ ] Referral to Podiatry     # T2DM  - Current Regimen: Metformin 1000 mg every day  - Last A1c (8/2023): 6.9%  [ ] HbA1c (11/2023):     # HLD  - Continue Simvastatin 20 mg every day     Labs:  - CBC, CMP, Urine Albumin, TSH w/ T4, Lipid Panel, HbA1c, Vitamin D (repeat 8/2024)  - CMP, Urine Albumin (repeat 2/2024)  - HbA1c today    Routine Screening:  - Colonoscopy (2022): diverticulosis in the sigmoid colon, repeat colonoscopy in 5 years (2027)    Please follow up in 3 months.

## 2023-11-29 NOTE — PROGRESS NOTES
I saw and evaluated the patient. I personally obtained the key and critical portions of the history and physical exam or was physically present for key and critical portions performed by the resident/fellow. I reviewed the resident/fellow's documentation and discussed the patient with the resident/fellow. I agree with the resident/fellow's medical decision making as documented in the note.    Darlene Booth MD

## 2024-01-22 DIAGNOSIS — E78.5 HYPERLIPIDEMIA, UNSPECIFIED HYPERLIPIDEMIA TYPE: ICD-10-CM

## 2024-01-22 RX ORDER — OMEGA-3-ACID ETHYL ESTERS 1 G/1
2 CAPSULE, LIQUID FILLED ORAL 2 TIMES DAILY
Qty: 360 CAPSULE | Refills: 3 | Status: SHIPPED | OUTPATIENT
Start: 2024-01-22 | End: 2024-03-13 | Stop reason: SDUPTHER

## 2024-01-31 DIAGNOSIS — I10 BENIGN ESSENTIAL HYPERTENSION: ICD-10-CM

## 2024-01-31 RX ORDER — LOSARTAN POTASSIUM 25 MG/1
25 TABLET ORAL DAILY
Qty: 90 TABLET | Refills: 0 | Status: SHIPPED | OUTPATIENT
Start: 2024-01-31 | End: 2024-03-13 | Stop reason: SDUPTHER

## 2024-03-13 ENCOUNTER — OFFICE VISIT (OUTPATIENT)
Dept: PRIMARY CARE | Facility: CLINIC | Age: 69
End: 2024-03-13
Payer: MEDICARE

## 2024-03-13 VITALS — WEIGHT: 283 LBS | BODY MASS INDEX: 38.38 KG/M2 | SYSTOLIC BLOOD PRESSURE: 110 MMHG | DIASTOLIC BLOOD PRESSURE: 72 MMHG

## 2024-03-13 DIAGNOSIS — E11.9 TYPE 2 DIABETES MELLITUS WITHOUT COMPLICATION, WITHOUT LONG-TERM CURRENT USE OF INSULIN (MULTI): ICD-10-CM

## 2024-03-13 DIAGNOSIS — E55.9 VITAMIN D DEFICIENCY: ICD-10-CM

## 2024-03-13 DIAGNOSIS — I25.10 ASCVD (ARTERIOSCLEROTIC CARDIOVASCULAR DISEASE): Primary | ICD-10-CM

## 2024-03-13 DIAGNOSIS — C61 CANCER OF PROSTATE (MULTI): ICD-10-CM

## 2024-03-13 DIAGNOSIS — I10 BENIGN ESSENTIAL HYPERTENSION: ICD-10-CM

## 2024-03-13 DIAGNOSIS — E78.5 HYPERLIPIDEMIA, UNSPECIFIED HYPERLIPIDEMIA TYPE: ICD-10-CM

## 2024-03-13 LAB — POC HEMOGLOBIN A1C: 6.9 % (ref 4.2–6.5)

## 2024-03-13 PROCEDURE — 1036F TOBACCO NON-USER: CPT | Performed by: INTERNAL MEDICINE

## 2024-03-13 PROCEDURE — 3074F SYST BP LT 130 MM HG: CPT | Performed by: INTERNAL MEDICINE

## 2024-03-13 PROCEDURE — 1160F RVW MEDS BY RX/DR IN RCRD: CPT | Performed by: INTERNAL MEDICINE

## 2024-03-13 PROCEDURE — 1124F ACP DISCUSS-NO DSCNMKR DOCD: CPT | Performed by: INTERNAL MEDICINE

## 2024-03-13 PROCEDURE — 1159F MED LIST DOCD IN RCRD: CPT | Performed by: INTERNAL MEDICINE

## 2024-03-13 PROCEDURE — G0439 PPPS, SUBSEQ VISIT: HCPCS | Performed by: INTERNAL MEDICINE

## 2024-03-13 PROCEDURE — 93000 ELECTROCARDIOGRAM COMPLETE: CPT | Performed by: INTERNAL MEDICINE

## 2024-03-13 PROCEDURE — 83036 HEMOGLOBIN GLYCOSYLATED A1C: CPT | Performed by: INTERNAL MEDICINE

## 2024-03-13 PROCEDURE — 3008F BODY MASS INDEX DOCD: CPT | Performed by: INTERNAL MEDICINE

## 2024-03-13 PROCEDURE — 4010F ACE/ARB THERAPY RXD/TAKEN: CPT | Performed by: INTERNAL MEDICINE

## 2024-03-13 PROCEDURE — 3078F DIAST BP <80 MM HG: CPT | Performed by: INTERNAL MEDICINE

## 2024-03-13 PROCEDURE — 1170F FXNL STATUS ASSESSED: CPT | Performed by: INTERNAL MEDICINE

## 2024-03-13 PROCEDURE — 99214 OFFICE O/P EST MOD 30 MIN: CPT | Performed by: INTERNAL MEDICINE

## 2024-03-13 RX ORDER — METFORMIN HYDROCHLORIDE 500 MG/1
1000 TABLET, EXTENDED RELEASE ORAL SEE ADMIN INSTRUCTIONS
Qty: 90 TABLET | Refills: 0 | Status: SHIPPED | OUTPATIENT
Start: 2024-03-13 | End: 2024-03-18 | Stop reason: SDUPTHER

## 2024-03-13 RX ORDER — OMEGA-3-ACID ETHYL ESTERS 1 G/1
2 CAPSULE, LIQUID FILLED ORAL 2 TIMES DAILY
Qty: 360 CAPSULE | Refills: 3 | Status: SHIPPED | OUTPATIENT
Start: 2024-03-13

## 2024-03-13 RX ORDER — ASPIRIN 81 MG/1
81 TABLET ORAL DAILY
Qty: 90 TABLET | Refills: 0 | Status: SHIPPED | OUTPATIENT
Start: 2024-03-13 | End: 2024-06-11

## 2024-03-13 RX ORDER — LOSARTAN POTASSIUM 25 MG/1
25 TABLET ORAL DAILY
Qty: 90 TABLET | Refills: 0 | Status: SHIPPED | OUTPATIENT
Start: 2024-03-13 | End: 2024-06-06 | Stop reason: SDUPTHER

## 2024-03-13 RX ORDER — SIMVASTATIN 20 MG/1
20 TABLET, FILM COATED ORAL DAILY
Qty: 90 TABLET | Refills: 3 | Status: SHIPPED | OUTPATIENT
Start: 2024-03-13 | End: 2024-06-06 | Stop reason: SDUPTHER

## 2024-03-13 RX ORDER — ACETAMINOPHEN 500 MG
2000 TABLET ORAL DAILY
Qty: 90 CAPSULE | Refills: 1 | Status: SHIPPED | OUTPATIENT
Start: 2024-03-13

## 2024-03-13 ASSESSMENT — PATIENT HEALTH QUESTIONNAIRE - PHQ9
SUM OF ALL RESPONSES TO PHQ9 QUESTIONS 1 AND 2: 0
2. FEELING DOWN, DEPRESSED OR HOPELESS: NOT AT ALL
2. FEELING DOWN, DEPRESSED OR HOPELESS: NOT AT ALL
SUM OF ALL RESPONSES TO PHQ9 QUESTIONS 1 AND 2: 0
1. LITTLE INTEREST OR PLEASURE IN DOING THINGS: NOT AT ALL
1. LITTLE INTEREST OR PLEASURE IN DOING THINGS: NOT AT ALL

## 2024-03-13 ASSESSMENT — ENCOUNTER SYMPTOMS
OCCASIONAL FEELINGS OF UNSTEADINESS: 0
LOSS OF SENSATION IN FEET: 0
DEPRESSION: 0

## 2024-03-13 ASSESSMENT — ACTIVITIES OF DAILY LIVING (ADL)
GROCERY_SHOPPING: INDEPENDENT
TAKING_MEDICATION: INDEPENDENT
DRESSING: INDEPENDENT
MANAGING_FINANCES: INDEPENDENT
BATHING: INDEPENDENT
DOING_HOUSEWORK: INDEPENDENT

## 2024-03-13 NOTE — PROGRESS NOTES
I reviewed the resident/fellow's documentation and discussed the patient with the resident/fellow. I agree with the resident/fellow's medical decision making as documented in the note.  As the attending physician, I certify that I personally reviewed the patient's history and personally examined the patient to confirm the physical findings described above, and that I reviewed the relevant imaging studies and available reports. I also discussed the differential diagnosis and all of the proposed management plans with the patient and individuals accompanying the patient to this visit. They had the opportunity to ask questions about the proposed management plans and to have those questions answered.     Darlene Booth MD

## 2024-03-13 NOTE — PROGRESS NOTES
Chief Complaint:   Chief Complaint   Patient presents with    Follow-up    Medicare Annual Wellness Visit Subsequent    Med Refill      HPI    Brayden George is a 68 y.o. year old male who presents to the clinic for a medicare wellness visit. He states that overall he has been doing well. He has been dealing with some occasional chest pain here and there. It has some relation to after he eats but he will also notice it on exertion. The pain is located in the epigastric region and is burning in nature. He does not have any pressure like sensation over his chest or any radiation of the pain into his neck or arm. He mentions that when he doesn't take his Metformin for a few days he will notice the chest pain will occur.    Denies abdominal pain, nausea, vomiting, fever, chills, headaches.     Medicare Wellness Billing Compliance Satisfied    *This is a visual tool to show completion of required items on the day of the visit. Green checks will only appear on the date of visit.    Review all medications by prescribing practitioner or clinical pharmacist (such as prescriptions, OTCs, herbal therapies and supplements) documented in the medical record    Past Medical, Surgical, and Family History reviewed and updated in chart    Tobacco Use Reviewed    Alcohol Use Reviewed    Illicit Drug Use Reviewed    PHQ2/9    Falls in Last Year Reviewed    Home Safety Risk Factors Reviewed    Cognitive Impairment Reviewed    Patient Self Assessment and Health Status    Current Diet Reviewed    Exercise Frequency    ADL - Hearing Impairment    ADL - Bathing    ADL - Dressing    ADL - Walks in Home    IADL - Managing Finances    IADL - Grocery Shopping    IADL - Taking Medications    IADL - Doing Housework        Past Medical History   Past Medical History:   Diagnosis Date    Acute prostatitis 04/06/2013    Acute bacterial prostatitis    Acute sinusitis, unspecified 02/21/2013    Acute sinusitis    Body mass index (BMI)  37.0-37.9, adult 07/03/2019    BMI 37.0-37.9, adult    Body mass index (BMI) 38.0-38.9, adult 10/01/2021    BMI 38.0-38.9,adult    Body mass index (BMI) 39.0-39.9, adult 04/04/2022    Body mass index (BMI) of 39.0 to 39.9 in adult    Body mass index (BMI)40.0-44.9, adult 01/05/2021    Body mass index (BMI) of 40.0 to 44.9 in adult    Personal history of other diseases of the respiratory system 02/21/2013    History of upper respiratory infection    Personal history of other endocrine, nutritional and metabolic disease 09/05/2019    History of obesity    Personal history of other endocrine, nutritional and metabolic disease 04/04/2022    History of morbid obesity    Personal history of other specified conditions 08/21/2014    History of chest pain    Personal history of other specified conditions 11/21/2014    History of diarrhea      Past Surgical History:   Past Surgical History:   Procedure Laterality Date    COLONOSCOPY  05/23/2013    Complete Colonoscopy     Family History:   Family History   Problem Relation Name Age of Onset    Heart disease Mother      Cirrhosis Father      Heart disease Father      Hypertension Sister      Hypertension Brother      Prostate cancer Other       Social History:   Tobacco Use: Low Risk  (3/13/2024)    Patient History     Smoking Tobacco Use: Never     Smokeless Tobacco Use: Never     Passive Exposure: Not on file      Social History     Substance and Sexual Activity   Alcohol Use Yes    Alcohol/week: 1.0 standard drink of alcohol    Types: 1 Shots of liquor per week      Allergies:   Allergies   Allergen Reactions    Ace Inhibitors Unknown      Objective   Vitals:    03/13/24 1041   BP: 110/72      BMI Readings from Last 15 Encounters:   03/13/24 38.38 kg/m²   11/29/23 38.38 kg/m²   09/15/23 38.65 kg/m²   08/10/23 37.87 kg/m²   06/28/23 37.87 kg/m²   04/28/23 37.64 kg/m²   01/27/23 38.04 kg/m²   10/14/22 38.57 kg/m²   09/13/22 37.94 kg/m²   07/15/22 38.70 kg/m²   04/04/22 39.62  kg/m²   03/22/22 39.95 kg/m²   01/03/22 39.37 kg/m²   10/12/21 39.43 kg/m²   10/01/21 38.84 kg/m²      128 kg (283 lb) (3/13/2024 10:41 AM)    Physical Exam  Physical Exam  Constitutional:       Appearance: He is obese.   HENT:      Head: Normocephalic and atraumatic.   Cardiovascular:      Rate and Rhythm: Normal rate and regular rhythm.      Pulses: Normal pulses.      Heart sounds: Normal heart sounds.   Pulmonary:      Effort: Pulmonary effort is normal.      Breath sounds: Normal breath sounds.   Abdominal:      General: Abdomen is flat. Bowel sounds are normal.      Palpations: Abdomen is soft.   Neurological:      General: No focal deficit present.      Mental Status: He is alert and oriented to person, place, and time.        Labs:  Lab Results   Component Value Date    WBC 4.6 08/10/2023    HGB 12.9 (L) 08/10/2023    HCT 38.8 (L) 08/10/2023    MCV 88 08/10/2023     08/10/2023     Lab Results   Component Value Date    GLUCOSE 138 (H) 08/10/2023    CALCIUM 9.6 08/10/2023     08/10/2023    K 3.9 08/10/2023    CO2 28 08/10/2023     08/10/2023    BUN 12 08/10/2023    CREATININE 0.89 08/10/2023     Lab Results   Component Value Date    HGBA1C 6.9 (A) 03/13/2024      Lab Results   Component Value Date    HGBA1C 6.9 (A) 03/13/2024    HGBA1C 7.2 (A) 11/29/2023    HGBA1C 6.9 (A) 08/10/2023     Lab Results   Component Value Date    TSH 1.77 08/10/2023     Current Medications:  Current Outpatient Medications   Medication Sig Dispense Refill    aspirin 81 mg EC tablet Take 1 tablet (81 mg) by mouth once daily. 90 tablet 0    azelastine (Astelin) 137 mcg (0.1 %) nasal spray Administer 2 sprays into each nostril 2 times a day. 72 mL 0    cholecalciferol (Vitamin D3) 50 mcg (2,000 unit) capsule Take 1 capsule (50 mcg) by mouth once daily. 90 capsule 1    losartan (Cozaar) 25 mg tablet Take 1 tablet (25 mg) by mouth once daily. 90 tablet 0    metFORMIN  mg 24 hr tablet Take 2 tablets (1,000 mg) by  mouth see administration instructions. 90 tablet 0    naproxen (Naprosyn) 500 mg tablet Take 1 tablet (500 mg) by mouth once daily. 90 tablet 0    omega-3 acid ethyl esters (Lovaza) 1 gram capsule Take 2 capsules (2 g) by mouth 2 times a day. 360 capsule 3    sildenafil (Viagra) 100 mg tablet Take 1 tablet (100 mg) by mouth if needed for erectile dysfunction.      simvastatin (Zocor) 20 mg tablet Take 1 tablet (20 mg) by mouth once daily. 90 tablet 3     No current facility-administered medications for this visit.       Assessment and Plan  Brayden was seen today for follow-up, medicare annual wellness visit subsequent and med refill.  Diagnoses and all orders for this visit:  ASCVD (arteriosclerotic cardiovascular disease) (Primary)  -     Referral to Cardiology; Future  Benign essential hypertension  -     aspirin 81 mg EC tablet; Take 1 tablet (81 mg) by mouth once daily.  -     losartan (Cozaar) 25 mg tablet; Take 1 tablet (25 mg) by mouth once daily.  -     Comprehensive metabolic panel; Future  -     Albumin , Urine Random; Future  -     ECG 12 Lead  -     Referral to Cardiology; Future  Type 2 diabetes mellitus without complication, without long-term current use of insulin (CMS/[facility])  -     metFORMIN  mg 24 hr tablet; Take 2 tablets (1,000 mg) by mouth see administration instructions.  -     POCT glycosylated hemoglobin (Hb A1C) manually resulted  Hyperlipidemia, unspecified hyperlipidemia type  -     simvastatin (Zocor) 20 mg tablet; Take 1 tablet (20 mg) by mouth once daily.  -     omega-3 acid ethyl esters (Lovaza) 1 gram capsule; Take 2 capsules (2 g) by mouth 2 times a day.  Vitamin D deficiency  -     cholecalciferol (Vitamin D3) 50 mcg (2,000 unit) capsule; Take 1 capsule (50 mcg) by mouth once daily.       # Chest Pain  2/2 Stable Angina vs GERD  - ASCVD: 22.2%  - Due to high risk of CAD, patient will likely require a stress test and further workup  [ ] Cardiology Referral     # HTN  - Current  Regimen: Losartan 25 mg every day  - Urine Albumin (8/2023): 109  - Cr/GFR (8/2023): 0.89/ >90  [ ] CMP, Urine Albumin     # T2DM  - Last HbA1c (3/2024): 6.9  - Current Regimen: Metformin 1000 mg every day  [ ] IO A1c     # HLD  - Last Lipid Panel:   - Continue Simvastatin 20 mg every day      Labs:  - CBC, CMP, Urine Albumin, TSH w/ T4, Lipid Panel, HbA1c, Vitamin D (repeat 8/2024)  - CMP, Urine Albumin, HbA1c due      Routine Screening:  - Colonoscopy (2022): diverticulosis in the sigmoid colon, repeat colonoscopy in 5 years (2027)    Immunizations:  Immunization History   Administered Date(s) Administered    Flu vaccine (IIV4), preservative free *Check age/dose* 11/30/2015, 10/09/2018    Flu vaccine, quadrivalent, high-dose, preservative free, age 65y+ (FLUZONE) 10/14/2022, 11/29/2023    Influenza, High Dose Seasonal, Preservative Free 12/09/2019, 10/12/2020    Influenza, Unspecified 12/06/2001, 11/30/2010, 11/30/2011, 08/29/2012, 02/27/2013, 11/20/2013, 11/21/2014, 11/16/2016, 01/17/2018    Pfizer Purple Cap SARS-CoV-2 09/23/2021    Pneumococcal conjugate vaccine, 13-valent (PREVNAR 13) 08/14/2017    Pneumococcal polysaccharide vaccine, 23-valent, age 2 years and older (PNEUMOVAX 23) 05/26/2010    Tdap vaccine, age 7 year and older (BOOSTRIX, ADACEL) 06/26/2005, 04/04/2022     Please follow up in 3 months.

## 2024-03-14 ENCOUNTER — OFFICE VISIT (OUTPATIENT)
Dept: CARDIOLOGY | Facility: CLINIC | Age: 69
End: 2024-03-14
Payer: MEDICARE

## 2024-03-14 VITALS
WEIGHT: 290 LBS | SYSTOLIC BLOOD PRESSURE: 150 MMHG | BODY MASS INDEX: 37.22 KG/M2 | HEIGHT: 74 IN | DIASTOLIC BLOOD PRESSURE: 90 MMHG | HEART RATE: 80 BPM | OXYGEN SATURATION: 95 %

## 2024-03-14 DIAGNOSIS — I20.89 OTHER FORMS OF ANGINA PECTORIS (CMS-HCC): Primary | ICD-10-CM

## 2024-03-14 DIAGNOSIS — R60.0 BILATERAL LEG EDEMA: ICD-10-CM

## 2024-03-14 PROCEDURE — 3080F DIAST BP >= 90 MM HG: CPT

## 2024-03-14 PROCEDURE — 1036F TOBACCO NON-USER: CPT

## 2024-03-14 PROCEDURE — 1159F MED LIST DOCD IN RCRD: CPT

## 2024-03-14 PROCEDURE — 1160F RVW MEDS BY RX/DR IN RCRD: CPT

## 2024-03-14 PROCEDURE — 3077F SYST BP >= 140 MM HG: CPT

## 2024-03-14 PROCEDURE — 3008F BODY MASS INDEX DOCD: CPT

## 2024-03-14 PROCEDURE — 99204 OFFICE O/P NEW MOD 45 MIN: CPT

## 2024-03-14 PROCEDURE — 4010F ACE/ARB THERAPY RXD/TAKEN: CPT

## 2024-03-14 RX ORDER — TAMSULOSIN HYDROCHLORIDE 0.4 MG/1
0.4 CAPSULE ORAL DAILY
COMMUNITY
Start: 2024-03-03 | End: 2024-03-18 | Stop reason: SDUPTHER

## 2024-03-14 NOTE — PATIENT INSTRUCTIONS
Brayden,    We will obtain a transthoracic echocardiogram for structural evaluation including ejection fraction, assessment of regional wall motion abnormalities or valvular disease, and further evaluation of hemodynamics.    Stress Echocardiogram.    FUV in 2 weeks    Elisabet GALVAN

## 2024-03-14 NOTE — PROGRESS NOTES
Referred by Dr.Tamar Valdez for New Patient Visit (Chest pain)     History Of Present Illness:    Brayden George is a 68 y.o. male with familial heart disease presenting with chest tightness worse when he is stressed.     He is here today with his wife and granddaughter. Chest pain is mid sternal and non radiating. It can occur at any time. Typical last several minutes. Pt denies shortness of breath, dyspnea on exertion, orthopnea, and paroxysmal nocturnal dyspnea.  Pt denies worsening lower extremity edema.  Pt denies palpitations or syncope.  No recent falls.  No fever or chills.  No cough.  No change in bowel or bladder habits.  No sick contacts.  No recent travel.    Review of Systems   Cardiovascular:  Positive for chest pain.   All other systems reviewed and are negative.    Past Medical History:  He has a past medical history of Acute prostatitis (04/06/2013), Acute sinusitis, unspecified (02/21/2013), Body mass index (BMI) 37.0-37.9, adult (07/03/2019), Body mass index (BMI) 38.0-38.9, adult (10/01/2021), Body mass index (BMI) 39.0-39.9, adult (04/04/2022), Body mass index (BMI)40.0-44.9, adult (01/05/2021), Personal history of other diseases of the respiratory system (02/21/2013), Personal history of other endocrine, nutritional and metabolic disease (09/05/2019), Personal history of other endocrine, nutritional and metabolic disease (04/04/2022), Personal history of other specified conditions (08/21/2014), and Personal history of other specified conditions (11/21/2014).    Past Surgical History:  He has a past surgical history that includes Colonoscopy (05/23/2013).      Social History:  He reports that he has never smoked. He has never used smokeless tobacco. He reports current alcohol use of about 1.0 standard drink of alcohol per week. He reports that he does not use drugs.    Family History:  Family History   Problem Relation Name Age of Onset    Heart disease Mother      Cirrhosis Father       "Heart disease Father      Hypertension Sister      Hypertension Brother      Prostate cancer Other          Allergies:  Ace inhibitors    Outpatient Medications:  Current Outpatient Medications   Medication Instructions    aspirin 81 mg, oral, Daily    azelastine (Astelin) 137 mcg (0.1 %) nasal spray 2 sprays, Each Nostril, 2 times daily    cholecalciferol (VITAMIN D3) 50 mcg, oral, Daily    losartan (COZAAR) 25 mg, oral, Daily    metFORMIN XR (GLUCOPHAGE-XR) 1,000 mg, oral, See admin instructions    naproxen (NAPROSYN) 500 mg, oral, Daily RT    omega-3 acid ethyl esters (LOVAZA) 2 g, oral, 2 times daily    sildenafil (VIAGRA) 100 mg, oral, As needed    simvastatin (ZOCOR) 20 mg, oral, Daily    tamsulosin (FLOMAX) 0.4 mg, oral, Daily        Last Recorded Vitals:  Vitals:    03/14/24 1325   BP: 150/90   Pulse: 80   SpO2: 95%   Weight: 132 kg (290 lb)   Height: 1.88 m (6' 2\")     Physical Exam  Constitutional:       Appearance: Normal appearance.   Neck:      Vascular: No carotid bruit.   Cardiovascular:      Rate and Rhythm: Normal rate and regular rhythm.      Pulses: Normal pulses.      Heart sounds: Normal heart sounds.      No gallop.   Pulmonary:      Effort: Pulmonary effort is normal.      Breath sounds: Normal breath sounds.   Abdominal:      Palpations: Abdomen is soft.   Musculoskeletal:      Cervical back: Neck supple.   Skin:     General: Skin is warm.      Capillary Refill: Capillary refill takes less than 2 seconds.   Neurological:      General: No focal deficit present.      Mental Status: He is alert and oriented to person, place, and time.   Psychiatric:         Mood and Affect: Mood normal.       Last Labs:  CBC -  Lab Results   Component Value Date    WBC 4.6 08/10/2023    HGB 12.9 (L) 08/10/2023    HCT 38.8 (L) 08/10/2023    MCV 88 08/10/2023     08/10/2023       CMP -  Lab Results   Component Value Date    CALCIUM 9.6 08/10/2023    PHOS 3.8 11/05/2019    PROT 7.1 08/10/2023    ALBUMIN 4.2 " 08/10/2023    AST 16 08/10/2023    ALT 18 08/10/2023    ALKPHOS 63 08/10/2023    BILITOT 0.4 08/10/2023       LIPID PANEL -   Lab Results   Component Value Date    CHOL 142 08/10/2023    TRIG 88 08/10/2023    HDL 57.4 08/10/2023    CHHDL 2.5 08/10/2023    LDLF 67 08/10/2023    VLDL 18 08/10/2023       RENAL FUNCTION PANEL -   Lab Results   Component Value Date    GLUCOSE 138 (H) 08/10/2023     08/10/2023    K 3.9 08/10/2023     08/10/2023    CO2 28 08/10/2023    ANIONGAP 12 08/10/2023    BUN 12 08/10/2023    CREATININE 0.89 08/10/2023    GFRMALE >90 08/10/2023    CALCIUM 9.6 08/10/2023    PHOS 3.8 11/05/2019    ALBUMIN 4.2 08/10/2023        Lab Results   Component Value Date    HGBA1C 6.9 (A) 03/13/2024       Last Cardiology Tests:  ECG:  ECG 12 Lead 03/13/2024  NSR 83 bpm    Assessment/Plan   Brayden George is a 68 y.o. male with familial heart disease presenting with chest tightness worse when he is stressed.     He is here today with his wife and granddaughter. Chest pain is mid sternal and non radiating. It can occur at any time. Typical last several minutes.  Today he is mildly hypertensive but reports to taking his medication prior to appointment.  He also has mild bilateral lower extremity edema.      - We will obtain a transthoracic echocardiogram for structural evaluation including ejection fraction, assessment of regional wall motion abnormalities or valvular disease, and further evaluation of hemodynamics.    -  Will have patient obtain a Exercise Stress Echocardiogram.      - Follow-up 2 weeks       CROW Mcqueen-CNP

## 2024-03-15 ENCOUNTER — APPOINTMENT (OUTPATIENT)
Dept: UROLOGY | Facility: HOSPITAL | Age: 69
End: 2024-03-15
Payer: MEDICARE

## 2024-03-18 ENCOUNTER — LAB (OUTPATIENT)
Dept: LAB | Facility: LAB | Age: 69
End: 2024-03-18
Payer: MEDICARE

## 2024-03-18 ENCOUNTER — OFFICE VISIT (OUTPATIENT)
Dept: UROLOGY | Facility: HOSPITAL | Age: 69
End: 2024-03-18
Payer: MEDICARE

## 2024-03-18 DIAGNOSIS — C61 CANCER OF PROSTATE (MULTI): Primary | ICD-10-CM

## 2024-03-18 DIAGNOSIS — N40.1 BPH WITH OBSTRUCTION/LOWER URINARY TRACT SYMPTOMS: ICD-10-CM

## 2024-03-18 DIAGNOSIS — I10 BENIGN ESSENTIAL HYPERTENSION: ICD-10-CM

## 2024-03-18 DIAGNOSIS — E11.9 TYPE 2 DIABETES MELLITUS WITHOUT COMPLICATION, WITHOUT LONG-TERM CURRENT USE OF INSULIN (MULTI): ICD-10-CM

## 2024-03-18 DIAGNOSIS — N13.8 BPH WITH OBSTRUCTION/LOWER URINARY TRACT SYMPTOMS: ICD-10-CM

## 2024-03-18 DIAGNOSIS — C61 CANCER OF PROSTATE (MULTI): ICD-10-CM

## 2024-03-18 LAB
ALBUMIN SERPL BCP-MCNC: 4.3 G/DL (ref 3.4–5)
ALP SERPL-CCNC: 63 U/L (ref 33–136)
ALT SERPL W P-5'-P-CCNC: 28 U/L (ref 10–52)
ANION GAP SERPL CALC-SCNC: 11 MMOL/L (ref 10–20)
AST SERPL W P-5'-P-CCNC: 20 U/L (ref 9–39)
BILIRUB SERPL-MCNC: 0.6 MG/DL (ref 0–1.2)
BUN SERPL-MCNC: 12 MG/DL (ref 6–23)
CALCIUM SERPL-MCNC: 9.6 MG/DL (ref 8.6–10.3)
CHLORIDE SERPL-SCNC: 101 MMOL/L (ref 98–107)
CO2 SERPL-SCNC: 30 MMOL/L (ref 21–32)
CREAT SERPL-MCNC: 0.95 MG/DL (ref 0.5–1.3)
CREAT UR-MCNC: 69.1 MG/DL (ref 20–370)
EGFRCR SERPLBLD CKD-EPI 2021: 87 ML/MIN/1.73M*2
GLUCOSE SERPL-MCNC: 131 MG/DL (ref 74–99)
MICROALBUMIN UR-MCNC: <7 MG/L
MICROALBUMIN/CREAT UR: NORMAL MG/G{CREAT}
POC APPEARANCE, URINE: CLEAR
POC BILIRUBIN, URINE: NEGATIVE
POC BLOOD, URINE: NEGATIVE
POC COLOR, URINE: YELLOW
POC GLUCOSE, URINE: NEGATIVE MG/DL
POC KETONES, URINE: NEGATIVE MG/DL
POC LEUKOCYTES, URINE: NEGATIVE
POC NITRITE,URINE: NEGATIVE
POC PH, URINE: 7 PH
POC PROTEIN, URINE: NEGATIVE MG/DL
POC SPECIFIC GRAVITY, URINE: 1.02
POC UROBILINOGEN, URINE: 0.2 EU/DL
POTASSIUM SERPL-SCNC: 4.1 MMOL/L (ref 3.5–5.3)
PROT SERPL-MCNC: 6.9 G/DL (ref 6.4–8.2)
PSA SERPL-MCNC: 0.17 NG/ML
SODIUM SERPL-SCNC: 138 MMOL/L (ref 136–145)

## 2024-03-18 PROCEDURE — 80053 COMPREHEN METABOLIC PANEL: CPT

## 2024-03-18 PROCEDURE — 99213 OFFICE O/P EST LOW 20 MIN: CPT | Performed by: NURSE PRACTITIONER

## 2024-03-18 PROCEDURE — 3008F BODY MASS INDEX DOCD: CPT | Performed by: NURSE PRACTITIONER

## 2024-03-18 PROCEDURE — 1160F RVW MEDS BY RX/DR IN RCRD: CPT | Performed by: NURSE PRACTITIONER

## 2024-03-18 PROCEDURE — 1159F MED LIST DOCD IN RCRD: CPT | Performed by: NURSE PRACTITIONER

## 2024-03-18 PROCEDURE — 4010F ACE/ARB THERAPY RXD/TAKEN: CPT | Performed by: NURSE PRACTITIONER

## 2024-03-18 PROCEDURE — 84153 ASSAY OF PSA TOTAL: CPT

## 2024-03-18 PROCEDURE — 51798 US URINE CAPACITY MEASURE: CPT | Performed by: NURSE PRACTITIONER

## 2024-03-18 PROCEDURE — 82570 ASSAY OF URINE CREATININE: CPT

## 2024-03-18 PROCEDURE — 82043 UR ALBUMIN QUANTITATIVE: CPT

## 2024-03-18 PROCEDURE — 36415 COLL VENOUS BLD VENIPUNCTURE: CPT

## 2024-03-18 PROCEDURE — 1036F TOBACCO NON-USER: CPT | Performed by: NURSE PRACTITIONER

## 2024-03-18 PROCEDURE — 81003 URINALYSIS AUTO W/O SCOPE: CPT | Performed by: NURSE PRACTITIONER

## 2024-03-18 RX ORDER — METFORMIN HYDROCHLORIDE 500 MG/1
500 TABLET, EXTENDED RELEASE ORAL
Qty: 180 TABLET | Refills: 0 | Status: SHIPPED | OUTPATIENT
Start: 2024-03-18 | End: 2024-06-06 | Stop reason: SDUPTHER

## 2024-03-18 RX ORDER — TAMSULOSIN HYDROCHLORIDE 0.4 MG/1
0.8 CAPSULE ORAL DAILY
Qty: 180 CAPSULE | Refills: 3 | Status: SHIPPED | OUTPATIENT
Start: 2024-03-18

## 2024-03-18 NOTE — PROGRESS NOTES
[facility]  Outpatient Clinic Note    Subjective   Brayden George is a 68 y.o. male 6 month FUV     History of Present Illness   He has a history of 3+4=7 prostate cancer s/p ADT in 2020 and radiation therapy completed in July 2020. He reports he is voiding well and continues to take Tamsulosin 0.8 mg daily. He denies any dysuria, gross hematuria, flank pain, fevers or chills. He continues to use TriMix and is happy with this.    Urinalysis today Nomal   PVR 57 ml     Lab Results   Component Value Date    PSA 0.35 09/14/2023    PSA 0.53 03/09/2023    PSA 0.32 09/12/2022    PSA 0.30 03/21/2022    PSA 0.29 12/22/2021    PSA 0.66 10/11/2021    PSA 0.79 07/13/2021    PSA 0.55 04/13/2021    PSA 0.67 09/29/2020    PSA 0.42 06/23/2020       Past Medical History and Surgical History   Past Medical History:   Diagnosis Date    Acute prostatitis 04/06/2013    Acute bacterial prostatitis    Acute sinusitis, unspecified 02/21/2013    Acute sinusitis    Body mass index (BMI) 37.0-37.9, adult 07/03/2019    BMI 37.0-37.9, adult    Body mass index (BMI) 38.0-38.9, adult 10/01/2021    BMI 38.0-38.9,adult    Body mass index (BMI) 39.0-39.9, adult 04/04/2022    Body mass index (BMI) of 39.0 to 39.9 in adult    Body mass index (BMI)40.0-44.9, adult 01/05/2021    Body mass index (BMI) of 40.0 to 44.9 in adult    Personal history of other diseases of the respiratory system 02/21/2013    History of upper respiratory infection    Personal history of other endocrine, nutritional and metabolic disease 09/05/2019    History of obesity    Personal history of other endocrine, nutritional and metabolic disease 04/04/2022    History of morbid obesity    Personal history of other specified conditions 08/21/2014    History of chest pain    Personal history of other specified conditions 11/21/2014    History of diarrhea     Past Surgical History:   Procedure Laterality Date    COLONOSCOPY  05/23/2013    Complete Colonoscopy        Medications  Current Outpatient Medications on File Prior to Visit   Medication Sig Dispense Refill    aspirin 81 mg EC tablet Take 1 tablet (81 mg) by mouth once daily. 90 tablet 0    azelastine (Astelin) 137 mcg (0.1 %) nasal spray Administer 2 sprays into each nostril 2 times a day. 72 mL 0    cholecalciferol (Vitamin D3) 50 mcg (2,000 unit) capsule Take 1 capsule (50 mcg) by mouth once daily. 90 capsule 1    losartan (Cozaar) 25 mg tablet Take 1 tablet (25 mg) by mouth once daily. 90 tablet 0    naproxen (Naprosyn) 500 mg tablet Take 1 tablet (500 mg) by mouth once daily. 90 tablet 0    omega-3 acid ethyl esters (Lovaza) 1 gram capsule Take 2 capsules (2 g) by mouth 2 times a day. 360 capsule 3    sildenafil (Viagra) 100 mg tablet Take 1 tablet (100 mg) by mouth if needed for erectile dysfunction.      simvastatin (Zocor) 20 mg tablet Take 1 tablet (20 mg) by mouth once daily. 90 tablet 3    tamsulosin (Flomax) 0.4 mg 24 hr capsule Take 1 capsule (0.4 mg) by mouth once daily.      [DISCONTINUED] aspirin 81 mg EC tablet Take 1 tablet (81 mg) by mouth once daily.      [DISCONTINUED] cholecalciferol (Vitamin D3) 50 mcg (2,000 unit) capsule Take 1 capsule (50 mcg) by mouth once daily. 90 capsule 1    [DISCONTINUED] losartan (Cozaar) 25 mg tablet Take 1 tablet (25 mg) by mouth once daily. 90 tablet 0    [DISCONTINUED] metFORMIN  mg 24 hr tablet Take 2 tablets (1,000 mg) by mouth see administration instructions. 90 tablet 0    [DISCONTINUED] metFORMIN  mg 24 hr tablet Take 2 tablets (1,000 mg) by mouth see administration instructions. 90 tablet 0    [DISCONTINUED] omega-3 acid ethyl esters (Lovaza) 1 gram capsule TAKE 2 CAPSULES TWICE A  capsule 3    [DISCONTINUED] simvastatin (Zocor) 20 mg tablet Take 1 tablet (20 mg) by mouth once daily. 90 tablet 3     No current facility-administered medications on file prior to visit.       Objective   Physicial Exam  General: Well developed, well  nourished, alert and cooperative, appears in no acute distress  Eyes: Non-injected conjunctiva, sclera clear, no proptosis  Cardiac: Extremities are warm and well perfused. No edema, cyanosis or pallor.   Lungs: Breathing is easy, non-labored. Speaking in clear and complete sentences. Normal diaphragmatic movement.  MSK: Ambulatory with steady gait, unassisted  Neuro: alert and oriented to person, place and time  Psych: Demonstrates good judgement and reason, without hallucinations, abnormal affect or abnormal behaviors.  Skin: no obvious lesions, no rashes.    Review of Systems  All other systems have been reviewed and are negative for complaint.      Assessment and Plan   1) Prostate Cancer  - PSA remains low  - PSA now and in 6 months     2) BPH   - PVR remains low  - Continue with Tamsulosin 0.8 mg daily, refilled to local [facility]     3) ED  - TriMix refilled (30/1/10) inject 20 units   - Declines info on penile prosthesis     Stop Sildenafil     Follow-up 6 months with labs, or sooner if needed.     All questions and concerns were addressed. Patient verbalizes understanding and has no other questions at this time.   If you have any questions about your care, do not hesitate to call and leave a message, we return calls in a timely manner.    Elena Bundy-- GIANNI MARIA  Office Phone:  [phone]

## 2024-03-19 NOTE — RESULT ENCOUNTER NOTE
I reviewed your results.  Everything looks good.  Please continue with current treatment.  Best,  Dr. Colon

## 2024-04-12 ENCOUNTER — ANCILLARY PROCEDURE (OUTPATIENT)
Dept: CARDIOLOGY | Facility: CLINIC | Age: 69
End: 2024-04-12
Payer: MEDICARE

## 2024-04-12 DIAGNOSIS — I20.89 OTHER FORMS OF ANGINA PECTORIS (CMS-HCC): ICD-10-CM

## 2024-04-12 DIAGNOSIS — R60.0 LOCALIZED EDEMA: ICD-10-CM

## 2024-04-12 LAB
AORTIC VALVE PEAK VELOCITY: 1.46 M/S
AV PEAK GRADIENT: 8.6 MMHG
AVA (PEAK VEL): 2.53 CM2
EJECTION FRACTION APICAL 4 CHAMBER: 49.2
LEFT ATRIUM VOLUME AREA LENGTH INDEX BSA: 27.3 ML/M2
LEFT VENTRICLE INTERNAL DIMENSION DIASTOLE: 4.22 CM (ref 3.5–6)
LEFT VENTRICULAR OUTFLOW TRACT DIAMETER: 2.24 CM
LV EJECTION FRACTION BIPLANE: 56 %
MITRAL VALVE E/A RATIO: 1.11
MITRAL VALVE E/E' RATIO: 9.63
RIGHT VENTRICLE FREE WALL PEAK S': 16 CM/S
RIGHT VENTRICLE PEAK SYSTOLIC PRESSURE: 27.8 MMHG
TRICUSPID ANNULAR PLANE SYSTOLIC EXCURSION: 2.4 CM

## 2024-04-12 PROCEDURE — 93017 CV STRESS TEST TRACING ONLY: CPT

## 2024-04-12 PROCEDURE — 93016 CV STRESS TEST SUPVJ ONLY: CPT | Performed by: STUDENT IN AN ORGANIZED HEALTH CARE EDUCATION/TRAINING PROGRAM

## 2024-04-12 PROCEDURE — 93306 TTE W/DOPPLER COMPLETE: CPT

## 2024-04-12 PROCEDURE — 2500000004 HC RX 250 GENERAL PHARMACY W/ HCPCS (ALT 636 FOR OP/ED): Performed by: STUDENT IN AN ORGANIZED HEALTH CARE EDUCATION/TRAINING PROGRAM

## 2024-04-12 PROCEDURE — 93350 STRESS TTE ONLY: CPT | Performed by: STUDENT IN AN ORGANIZED HEALTH CARE EDUCATION/TRAINING PROGRAM

## 2024-04-12 PROCEDURE — 93018 CV STRESS TEST I&R ONLY: CPT | Performed by: STUDENT IN AN ORGANIZED HEALTH CARE EDUCATION/TRAINING PROGRAM

## 2024-04-12 PROCEDURE — 93306 TTE W/DOPPLER COMPLETE: CPT | Performed by: STUDENT IN AN ORGANIZED HEALTH CARE EDUCATION/TRAINING PROGRAM

## 2024-04-12 RX ADMIN — PERFLUTREN 4 ML OF DILUTION: 6.52 INJECTION, SUSPENSION INTRAVENOUS at 09:15

## 2024-04-17 ENCOUNTER — OFFICE VISIT (OUTPATIENT)
Dept: CARDIOLOGY | Facility: CLINIC | Age: 69
End: 2024-04-17
Payer: MEDICARE

## 2024-04-17 VITALS
BODY MASS INDEX: 37.22 KG/M2 | SYSTOLIC BLOOD PRESSURE: 136 MMHG | HEART RATE: 88 BPM | DIASTOLIC BLOOD PRESSURE: 60 MMHG | OXYGEN SATURATION: 96 % | WEIGHT: 290 LBS | HEIGHT: 74 IN

## 2024-04-17 DIAGNOSIS — I50.30 HEART FAILURE WITH PRESERVED EJECTION FRACTION, UNSPECIFIED HF CHRONICITY (MULTI): Primary | ICD-10-CM

## 2024-04-17 DIAGNOSIS — R73.03 PREDIABETES: ICD-10-CM

## 2024-04-17 DIAGNOSIS — I10 BENIGN ESSENTIAL HYPERTENSION: ICD-10-CM

## 2024-04-17 DIAGNOSIS — E78.5 HYPERLIPIDEMIA, UNSPECIFIED HYPERLIPIDEMIA TYPE: ICD-10-CM

## 2024-04-17 PROCEDURE — 3008F BODY MASS INDEX DOCD: CPT

## 2024-04-17 PROCEDURE — 3075F SYST BP GE 130 - 139MM HG: CPT

## 2024-04-17 PROCEDURE — 1159F MED LIST DOCD IN RCRD: CPT

## 2024-04-17 PROCEDURE — 1160F RVW MEDS BY RX/DR IN RCRD: CPT

## 2024-04-17 PROCEDURE — 4010F ACE/ARB THERAPY RXD/TAKEN: CPT

## 2024-04-17 PROCEDURE — 3062F POS MACROALBUMINURIA REV: CPT

## 2024-04-17 PROCEDURE — 3078F DIAST BP <80 MM HG: CPT

## 2024-04-17 PROCEDURE — 1036F TOBACCO NON-USER: CPT

## 2024-04-17 PROCEDURE — 99214 OFFICE O/P EST MOD 30 MIN: CPT

## 2024-04-17 RX ORDER — LORATADINE 10 MG/1
TABLET ORAL
COMMUNITY
Start: 2022-04-04 | End: 2024-04-17 | Stop reason: ALTCHOICE

## 2024-04-17 ASSESSMENT — ENCOUNTER SYMPTOMS: OCCASIONAL FEELINGS OF UNSTEADINESS: 0

## 2024-04-17 NOTE — PROGRESS NOTES
"Chief Complaint:   FUV diagnostic testing  History Of Present Illness:    Brayden George is a 68 y.o. male with familial heart disease, pre-diabetes, HTN, HLD presenting for FUV of diagnostic testing to evaluate chest tightness .     He is here today with his wife. He reports that chest pain has subsided.   Patient denies chest pain and angina.  Pt denies shortness of breath, dyspnea on exertion, orthopnea, and paroxysmal nocturnal dyspnea.  Pt denies worsening lower extremity edema.  Pt denies palpitations or syncope.  No recent falls.  No fever or chills.  No cough.  No change in bowel or bladder habits.  No sick contacts.  No recent travel.    Review of Systems   All other systems reviewed and are negative.       Last Recorded Vitals:  Vitals:    04/17/24 1302   BP: 136/60   Pulse: 88   SpO2: 96%   Weight: 132 kg (290 lb)   Height: 1.88 m (6' 2\")       Past Medical History:  He has a past medical history of Acute prostatitis (04/06/2013), Acute sinusitis, unspecified (02/21/2013), Body mass index (BMI) 37.0-37.9, adult (07/03/2019), Body mass index (BMI) 38.0-38.9, adult (10/01/2021), Body mass index (BMI) 39.0-39.9, adult (04/04/2022), Body mass index (BMI)40.0-44.9, adult (01/05/2021), Personal history of other diseases of the respiratory system (02/21/2013), Personal history of other endocrine, nutritional and metabolic disease (09/05/2019), Personal history of other endocrine, nutritional and metabolic disease (04/04/2022), Personal history of other specified conditions (08/21/2014), and Personal history of other specified conditions (11/21/2014).    Past Surgical History:  He has a past surgical history that includes Colonoscopy (05/23/2013).      Social History:  He reports that he has never smoked. He has never used smokeless tobacco. He reports current alcohol use of about 1.0 standard drink of alcohol per week. He reports that he does not use drugs.    Family History:  Family History   Problem Relation Name " Age of Onset    Heart disease Mother      Cirrhosis Father      Heart disease Father      Hypertension Sister      Hypertension Brother      Prostate cancer Other          Allergies:  Ace inhibitors    Outpatient Medications:  Current Outpatient Medications   Medication Instructions    aspirin 81 mg, oral, Daily    azelastine (Astelin) 137 mcg (0.1 %) nasal spray 2 sprays, Each Nostril, 2 times daily    cholecalciferol (VITAMIN D3) 50 mcg, oral, Daily    losartan (COZAAR) 25 mg, oral, Daily    metFORMIN XR (GLUCOPHAGE-XR) 500 mg, oral, 2 times daily with meals    naproxen (NAPROSYN) 500 mg, oral, Daily RT    omega-3 acid ethyl esters (LOVAZA) 2 g, oral, 2 times daily    simvastatin (ZOCOR) 20 mg, oral, Daily    tamsulosin (FLOMAX) 0.8 mg, oral, Daily       Physical Exam  Constitutional:       Appearance: Normal appearance. He is morbidly obese.   Neck:      Vascular: No carotid bruit.   Cardiovascular:      Rate and Rhythm: Normal rate and regular rhythm.      Pulses: Normal pulses.      Heart sounds: Normal heart sounds.      No gallop.   Pulmonary:      Effort: Pulmonary effort is normal.      Breath sounds: Normal breath sounds.   Abdominal:      Palpations: Abdomen is soft.   Musculoskeletal:      Cervical back: Neck supple.      Right lower leg: Edema (+1 pitting) present.      Left lower leg: Edema (+1 pitting) present.   Skin:     General: Skin is warm.      Capillary Refill: Capillary refill takes less than 2 seconds.   Neurological:      General: No focal deficit present.      Mental Status: He is alert and oriented to person, place, and time.   Psychiatric:         Mood and Affect: Mood normal.         Last Labs:  CBC -  Lab Results   Component Value Date    WBC 4.6 08/10/2023    HGB 12.9 (L) 08/10/2023    HCT 38.8 (L) 08/10/2023    MCV 88 08/10/2023     08/10/2023       CMP -  Lab Results   Component Value Date    CALCIUM 9.6 03/18/2024    PHOS 3.8 11/05/2019    PROT 6.9 03/18/2024    ALBUMIN 4.3  03/18/2024    AST 20 03/18/2024    ALT 28 03/18/2024    ALKPHOS 63 03/18/2024    BILITOT 0.6 03/18/2024       LIPID PANEL -   Lab Results   Component Value Date    CHOL 142 08/10/2023    TRIG 88 08/10/2023    HDL 57.4 08/10/2023    CHHDL 2.5 08/10/2023    LDLF 67 08/10/2023    VLDL 18 08/10/2023       RENAL FUNCTION PANEL -   Lab Results   Component Value Date    GLUCOSE 131 (H) 03/18/2024     03/18/2024    K 4.1 03/18/2024     03/18/2024    CO2 30 03/18/2024    ANIONGAP 11 03/18/2024    BUN 12 03/18/2024    CREATININE 0.95 03/18/2024    GFRMALE >90 08/10/2023    CALCIUM 9.6 03/18/2024    PHOS 3.8 11/05/2019    ALBUMIN 4.3 03/18/2024        Lab Results   Component Value Date    HGBA1C 6.9 (A) 03/13/2024       Last Cardiology Tests:  ECG:  ECG 12 Lead 03/13/2024  NSR 83 bpm     Echo  Echocardiogram stress test 04/12/2024  CONCLUSIONS:   1. Left ventricular systolic function is normal with a 55-60% estimated ejection fraction.   2. Spectral Doppler shows an impaired relaxation pattern of left ventricular diastolic filling.   3. Moderately increased left ventricular septal thickness.   4. The inferior vena cava appears to be of normal size. There is IVC inspiratory collapse greater than 50%  Stress Test:  Summary:   1. Adequate level of stress achieved.   2. The patient exercised to stage II on a Clarence protocol for 4 minutes and 15 seconds, achieving 6 METS. The peak heart rate achieved was 155 bpm, which was 103 % of the age predicted target heart rate.   3. There were no stress-induced wall motion abnormalities. This is a negative stress echo test for ischemia.   4. No electrocardiographic, echocardiographic or clinical evidence for ischemia at a maximal workload.       Assessment/Plan   Brayden George is a 68 y.o. male with familial heart disease, pre-diabetes, HTN, HLD presenting for FUV of diagnostic testing to evaluate chest tightness .     He is here today with his wife. He reports that chest pain has  subsided. He denies cardiac symptoms. We discussed the results of echocardiogram and stress test. Echo with normal LV function but does have diastolic dysfunction. We discussed diet modification and limit Na to no more than 2GM. LDL at goal.  Difficult to discern volume status d/t body habitus. However, appears volume up.    - Start SGLT2i     - BNP, BMP today    - FUV virtually in 3 weeks     Elisabet Alejandre, APRN-CNP

## 2024-04-17 NOTE — PATIENT INSTRUCTIONS
Brayden,    Blood work     Start Jardiance  10 mg daily    Follow up in 4 weeks virtually    Elisabet Alejandre APRN-CNP

## 2024-05-17 ENCOUNTER — APPOINTMENT (OUTPATIENT)
Dept: CARDIOLOGY | Facility: CLINIC | Age: 69
End: 2024-05-17
Payer: MEDICARE

## 2024-05-23 ENCOUNTER — LAB (OUTPATIENT)
Dept: LAB | Facility: LAB | Age: 69
End: 2024-05-23
Payer: MEDICARE

## 2024-05-23 DIAGNOSIS — I50.30 HEART FAILURE WITH PRESERVED EJECTION FRACTION, UNSPECIFIED HF CHRONICITY (MULTI): ICD-10-CM

## 2024-05-23 LAB — BNP SERPL-MCNC: 9 PG/ML (ref 0–99)

## 2024-05-23 PROCEDURE — 36415 COLL VENOUS BLD VENIPUNCTURE: CPT

## 2024-05-23 PROCEDURE — 80048 BASIC METABOLIC PNL TOTAL CA: CPT

## 2024-05-23 PROCEDURE — 83880 ASSAY OF NATRIURETIC PEPTIDE: CPT

## 2024-05-24 ENCOUNTER — TELEMEDICINE (OUTPATIENT)
Dept: CARDIOLOGY | Facility: CLINIC | Age: 69
End: 2024-05-24
Payer: MEDICARE

## 2024-05-24 DIAGNOSIS — R73.03 PRE-DIABETES: ICD-10-CM

## 2024-05-24 DIAGNOSIS — I50.30 HEART FAILURE WITH PRESERVED EJECTION FRACTION, UNSPECIFIED HF CHRONICITY (MULTI): Primary | ICD-10-CM

## 2024-05-24 LAB
ANION GAP SERPL CALC-SCNC: 14 MMOL/L (ref 10–20)
BUN SERPL-MCNC: 11 MG/DL (ref 6–23)
CALCIUM SERPL-MCNC: 9.4 MG/DL (ref 8.6–10.6)
CHLORIDE SERPL-SCNC: 105 MMOL/L (ref 98–107)
CO2 SERPL-SCNC: 27 MMOL/L (ref 21–32)
CREAT SERPL-MCNC: 0.91 MG/DL (ref 0.5–1.3)
EGFRCR SERPLBLD CKD-EPI 2021: >90 ML/MIN/1.73M*2
GLUCOSE SERPL-MCNC: 174 MG/DL (ref 74–99)
POTASSIUM SERPL-SCNC: 3.9 MMOL/L (ref 3.5–5.3)
SODIUM SERPL-SCNC: 142 MMOL/L (ref 136–145)

## 2024-05-24 PROCEDURE — 4010F ACE/ARB THERAPY RXD/TAKEN: CPT

## 2024-05-24 PROCEDURE — 1160F RVW MEDS BY RX/DR IN RCRD: CPT

## 2024-05-24 PROCEDURE — 99212 OFFICE O/P EST SF 10 MIN: CPT

## 2024-05-24 PROCEDURE — 3062F POS MACROALBUMINURIA REV: CPT

## 2024-05-24 PROCEDURE — 1159F MED LIST DOCD IN RCRD: CPT

## 2024-05-24 PROCEDURE — 3008F BODY MASS INDEX DOCD: CPT

## 2024-05-24 NOTE — PROGRESS NOTES
Chief Complaint:   FUV     History Of Present Illness:    Brayden George is a 68 y.o. male with familial heart disease, HFpEF pre-diabetes, HTN, HLD presenting for tele-visit to discuss blood work and medications.    He reports that he has been doing well. He has not started Jardiance and report that will be doing in 2 days.  Patient denies chest pain and angina.  Pt denies shortness of breath, dyspnea on exertion, orthopnea, and paroxysmal nocturnal dyspnea.  Pt denies worsening lower extremity edema.  Pt denies palpitations or syncope.  No recent falls.  No fever or chills.  No cough.  No change in bowel or bladder habits.  No sick contacts.  No recent travel.    Review of Systems   All other systems reviewed and are negative.    Past Medical History:  He has a past medical history of Acute prostatitis (04/06/2013), Acute sinusitis, unspecified (02/21/2013), Body mass index (BMI) 37.0-37.9, adult (07/03/2019), Body mass index (BMI) 38.0-38.9, adult (10/01/2021), Body mass index (BMI) 39.0-39.9, adult (04/04/2022), Body mass index (BMI)40.0-44.9, adult (01/05/2021), Personal history of other diseases of the respiratory system (02/21/2013), Personal history of other endocrine, nutritional and metabolic disease (09/05/2019), Personal history of other endocrine, nutritional and metabolic disease (04/04/2022), Personal history of other specified conditions (08/21/2014), and Personal history of other specified conditions (11/21/2014).    Past Surgical History:  He has a past surgical history that includes Colonoscopy (05/23/2013).      Social History:  He reports that he has never smoked. He has never used smokeless tobacco. He reports current alcohol use of about 1.0 standard drink of alcohol per week. He reports that he does not use drugs.    Family History:  Family History   Problem Relation Name Age of Onset    Heart disease Mother      Cirrhosis Father      Heart disease Father      Hypertension Sister       Hypertension Brother      Prostate cancer Other          Allergies:  Ace inhibitors    Outpatient Medications:  Current Outpatient Medications   Medication Instructions    aspirin 81 mg, oral, Daily    azelastine (Astelin) 137 mcg (0.1 %) nasal spray 2 sprays, Each Nostril, 2 times daily    cholecalciferol (VITAMIN D3) 50 mcg, oral, Daily    empagliflozin (JARDIANCE) 10 mg, oral, Daily    losartan (COZAAR) 25 mg, oral, Daily    metFORMIN XR (GLUCOPHAGE-XR) 500 mg, oral, 2 times daily (morning and late afternoon)    naproxen (NAPROSYN) 500 mg, oral, Daily RT    omega-3 acid ethyl esters (LOVAZA) 2 g, oral, 2 times daily    simvastatin (ZOCOR) 20 mg, oral, Daily    tamsulosin (FLOMAX) 0.8 mg, oral, Daily        Last Labs:  CBC -  Lab Results   Component Value Date    WBC 4.6 08/10/2023    HGB 12.9 (L) 08/10/2023    HCT 38.8 (L) 08/10/2023    MCV 88 08/10/2023     08/10/2023       CMP -  Lab Results   Component Value Date    CALCIUM 9.4 05/23/2024    PHOS 3.8 11/05/2019    PROT 6.9 03/18/2024    ALBUMIN 4.3 03/18/2024    AST 20 03/18/2024    ALT 28 03/18/2024    ALKPHOS 63 03/18/2024    BILITOT 0.6 03/18/2024       LIPID PANEL -   Lab Results   Component Value Date    CHOL 142 08/10/2023    TRIG 88 08/10/2023    HDL 57.4 08/10/2023    CHHDL 2.5 08/10/2023    LDLF 67 08/10/2023    VLDL 18 08/10/2023       RENAL FUNCTION PANEL -   Lab Results   Component Value Date    GLUCOSE 174 (H) 05/23/2024     05/23/2024    K 3.9 05/23/2024     05/23/2024    CO2 27 05/23/2024    ANIONGAP 14 05/23/2024    BUN 11 05/23/2024    CREATININE 0.91 05/23/2024    GFRMALE >90 08/10/2023    CALCIUM 9.4 05/23/2024    PHOS 3.8 11/05/2019    ALBUMIN 4.3 03/18/2024        Lab Results   Component Value Date    BNP 9 05/23/2024    HGBA1C 6.9 (A) 03/13/2024     Last Cardiology Tests:  ECG:  ECG 12 Lead 03/13/2024  NSR 83 bpm     4/12/2024 Echocardiogram CONCLUSIONS:   1. Left ventricular systolic function is normal with a 55-60%  estimated ejection fraction.   2. Spectral Doppler shows an impaired relaxation pattern of left ventricular diastolic filling.   3. Moderately increased left ventricular septal thickness.   4. The inferior vena cava appears to be of normal size. There is IVC inspiratory collapse greater than 50%.    4/12/2024 Exercise Stress Echocardiogram:  Summary:   1. Adequate level of stress achieved.   2. The patient exercised to stage II on a Clarence protocol for 4 minutes and 15 seconds, achieving 6 METS. The peak heart rate achieved was 155 bpm, which was 103 % of the age predicted target heart rate.   3. There were no stress-induced wall motion abnormalities. This is a negative stress echo test for ischemia.   4. No electrocardiographic, echocardiographic or clinical evidence for ischemia at a maximal workload.     Assessment/Plan   Brayden George is a 68 y.o. male with familial heart disease, HFpEF pre-diabetes, HTN, HLD presenting for tele-visit to discuss blood work and medications.    He reports that he has been doing well. He has not started Jardiance and reports that he will be doing so in 2 days. BNP normal BMP with elevated glucose otherwise normal. He denies cardiac complaints.    Patient to start Jardiance 10 mg and follow up in 3 months       Elisabet Alejandre, APRN-CNP

## 2024-06-06 ENCOUNTER — OFFICE VISIT (OUTPATIENT)
Dept: PRIMARY CARE | Facility: CLINIC | Age: 69
End: 2024-06-06
Payer: MEDICARE

## 2024-06-06 VITALS — SYSTOLIC BLOOD PRESSURE: 120 MMHG | WEIGHT: 293 LBS | BODY MASS INDEX: 37.62 KG/M2 | DIASTOLIC BLOOD PRESSURE: 60 MMHG

## 2024-06-06 DIAGNOSIS — E11.9 TYPE 2 DIABETES MELLITUS WITHOUT COMPLICATION, WITHOUT LONG-TERM CURRENT USE OF INSULIN (MULTI): ICD-10-CM

## 2024-06-06 DIAGNOSIS — E78.5 HYPERLIPIDEMIA, UNSPECIFIED HYPERLIPIDEMIA TYPE: ICD-10-CM

## 2024-06-06 DIAGNOSIS — E55.9 VITAMIN D DEFICIENCY: ICD-10-CM

## 2024-06-06 DIAGNOSIS — I10 BENIGN ESSENTIAL HYPERTENSION: ICD-10-CM

## 2024-06-06 DIAGNOSIS — J30.9 ALLERGIC RHINITIS, UNSPECIFIED SEASONALITY, UNSPECIFIED TRIGGER: Primary | ICD-10-CM

## 2024-06-06 DIAGNOSIS — I25.10 ASCVD (ARTERIOSCLEROTIC CARDIOVASCULAR DISEASE): ICD-10-CM

## 2024-06-06 PROCEDURE — 3008F BODY MASS INDEX DOCD: CPT | Performed by: INTERNAL MEDICINE

## 2024-06-06 PROCEDURE — 3078F DIAST BP <80 MM HG: CPT | Performed by: INTERNAL MEDICINE

## 2024-06-06 PROCEDURE — 3062F POS MACROALBUMINURIA REV: CPT | Performed by: INTERNAL MEDICINE

## 2024-06-06 PROCEDURE — 3074F SYST BP LT 130 MM HG: CPT | Performed by: INTERNAL MEDICINE

## 2024-06-06 PROCEDURE — 4010F ACE/ARB THERAPY RXD/TAKEN: CPT | Performed by: INTERNAL MEDICINE

## 2024-06-06 PROCEDURE — 99214 OFFICE O/P EST MOD 30 MIN: CPT | Performed by: INTERNAL MEDICINE

## 2024-06-06 RX ORDER — LOSARTAN POTASSIUM 25 MG/1
25 TABLET ORAL DAILY
Qty: 90 TABLET | Refills: 0 | Status: SHIPPED | OUTPATIENT
Start: 2024-06-06 | End: 2024-09-04

## 2024-06-06 RX ORDER — SIMVASTATIN 20 MG/1
20 TABLET, FILM COATED ORAL DAILY
Qty: 90 TABLET | Refills: 3 | Status: SHIPPED | OUTPATIENT
Start: 2024-06-06

## 2024-06-06 RX ORDER — METFORMIN HYDROCHLORIDE 500 MG/1
500 TABLET, EXTENDED RELEASE ORAL
Qty: 180 TABLET | Refills: 1 | Status: SHIPPED | OUTPATIENT
Start: 2024-06-06

## 2024-06-06 NOTE — PROGRESS NOTES
Chief Complaint:   Chief Complaint   Patient presents with    Follow-up    Med Refill      HPI    Brayden George is a 68 y.o. year old male who presents to the clinic for follow up    Past Medical History   Past Medical History:   Diagnosis Date    Acute prostatitis 04/06/2013    Acute bacterial prostatitis    Acute sinusitis, unspecified 02/21/2013    Acute sinusitis    Body mass index (BMI) 37.0-37.9, adult 07/03/2019    BMI 37.0-37.9, adult    Body mass index (BMI) 38.0-38.9, adult 10/01/2021    BMI 38.0-38.9,adult    Body mass index (BMI) 39.0-39.9, adult 04/04/2022    Body mass index (BMI) of 39.0 to 39.9 in adult    Body mass index (BMI)40.0-44.9, adult 01/05/2021    Body mass index (BMI) of 40.0 to 44.9 in adult    Personal history of other diseases of the respiratory system 02/21/2013    History of upper respiratory infection    Personal history of other endocrine, nutritional and metabolic disease 09/05/2019    History of obesity    Personal history of other endocrine, nutritional and metabolic disease 04/04/2022    History of morbid obesity    Personal history of other specified conditions 08/21/2014    History of chest pain    Personal history of other specified conditions 11/21/2014    History of diarrhea      Past Surgical History:   Past Surgical History:   Procedure Laterality Date    COLONOSCOPY  05/23/2013    Complete Colonoscopy     Family History:   Family History   Problem Relation Name Age of Onset    Heart disease Mother      Cirrhosis Father      Heart disease Father      Hypertension Sister      Hypertension Brother      Prostate cancer Other       Social History:   Tobacco Use: Low Risk  (4/17/2024)    Patient History     Smoking Tobacco Use: Never     Smokeless Tobacco Use: Never     Passive Exposure: Not on file      Social History     Substance and Sexual Activity   Alcohol Use Yes    Alcohol/week: 1.0 standard drink of alcohol    Types: 1 Shots of liquor per week        Allergies:  "  Allergies   Allergen Reactions    Ace Inhibitors Unknown        ROS   Review of Systems   Constitutional: Negative.    Respiratory: Negative.     Cardiovascular: Negative.    Gastrointestinal: Negative.         Objective   Vitals:    06/06/24 1136   BP: 120/60      BMI Readings from Last 15 Encounters:   06/06/24 37.62 kg/m²   04/17/24 37.23 kg/m²   03/14/24 37.23 kg/m²   03/13/24 38.38 kg/m²   11/29/23 38.38 kg/m²   09/15/23 38.65 kg/m²   08/10/23 37.87 kg/m²   06/28/23 37.87 kg/m²   04/28/23 37.64 kg/m²   01/27/23 38.04 kg/m²   10/14/22 38.57 kg/m²   09/13/22 37.94 kg/m²   07/15/22 38.70 kg/m²   04/04/22 39.62 kg/m²   03/22/22 39.95 kg/m²      133 kg (293 lb) (6/6/2024 11:36 AM)      Physical Exam  Physical Exam  Neurological:      Mental Status: He is alert.   Psychiatric:         Mood and Affect: Mood normal.          Labs:  CBC:  Recent Labs     08/10/23  1147 07/07/23  1131 10/14/22  1220   WBC 4.6 3.7* 4.5   HGB 12.9* 13.0* 13.3*   HCT 38.8* 38.5* 38.5*    148* 160   MCV 88 86 89     CMP:  Recent Labs     05/23/24  1440 03/18/24  1413 08/10/23  1147    138 141   K 3.9 4.1 3.9    101 105   CO2 27 30 28   ANIONGAP 14 11 12   BUN 11 12 12   CREATININE 0.91 0.95 0.89   EGFR >90 87  --    GLUCOSE 174* 131* 138*     Recent Labs     03/18/24  1413 08/10/23  1147 10/14/22  1220 10/24/18  1223 10/09/18  1134   ALBUMIN 4.3 4.2 4.4   < > 4.5   ALKPHOS 63 63 59   < > 90   ALT 28 18 15   < > 23   AST 20 16 13   < > 14   BILITOT 0.6 0.4 0.5   < > 0.7   LIPASE  --   --   --   --  17    < > = values in this interval not displayed.     Calcium/Phos:   Lab Results   Component Value Date    CALCIUM 9.4 05/23/2024    PHOS 3.8 11/05/2019      COAG: No results for input(s): \"INR\", \"DDIMERVTE\" in the last 19244 hours.  CRP: No results found for: \"CRP\"   [unfilled]   ENDO:  Recent Labs     03/13/24  1110 11/29/23  1202 08/10/23  1147 04/04/22  1128 07/01/21  1052 04/13/21  1455 07/13/20  1440 12/09/19  1015 " 09/05/19  1029 04/08/19  1110   TSH  --   --  1.77  --   --  2.05  --  2.66  --  3.33   HGBA1C 6.9* 7.2* 6.9* 7.3*   < > 7.7   < > 6.6   < >  --     < > = values in this interval not displayed.      CARDIAC:   Recent Labs     05/23/24  1440 07/07/23  1131   TROPHS  --  3   BNP 9  --      Recent Labs     08/10/23  1147 10/14/22  1220 04/04/22  1128 01/12/21  1107   CHOL 142 125 154 139   LDLF 67 59  --  68   HDL 57.4 53.2 60.6 52.6   TRIG 88 63  --  93     No data recorded    Current Medications:  Current Outpatient Medications   Medication Sig Dispense Refill    aspirin 81 mg EC tablet Take 1 tablet (81 mg) by mouth once daily. 90 tablet 0    azelastine (Astelin) 137 mcg (0.1 %) nasal spray Administer 2 sprays into each nostril 2 times a day. 72 mL 0    cholecalciferol (Vitamin D3) 50 mcg (2,000 unit) capsule Take 1 capsule (50 mcg) by mouth once daily. 90 capsule 1    empagliflozin (Jardiance) 10 mg Take 1 tablet (10 mg) by mouth once daily. 30 tablet 11    losartan (Cozaar) 25 mg tablet Take 1 tablet (25 mg) by mouth once daily. 90 tablet 0    metFORMIN  mg 24 hr tablet Take 1 tablet (500 mg) by mouth 2 times daily (morning and late afternoon). Takes once daily 180 tablet 1    naproxen (Naprosyn) 500 mg tablet Take 1 tablet (500 mg) by mouth once daily. 90 tablet 0    omega-3 acid ethyl esters (Lovaza) 1 gram capsule Take 2 capsules (2 g) by mouth 2 times a day. 360 capsule 3    simvastatin (Zocor) 20 mg tablet Take 1 tablet (20 mg) by mouth once daily. 90 tablet 3    tamsulosin (Flomax) 0.4 mg 24 hr capsule Take 2 capsules (0.8 mg) by mouth once daily. 180 capsule 3     No current facility-administered medications for this visit.       Assessment and Plan  Brayden was seen today for follow-up and med refill.  Diagnoses and all orders for this visit:  Allergic rhinitis, unspecified seasonality, unspecified trigger (Primary)  Type 2 diabetes mellitus without complication, without long-term current use of  insulin (Multi)  -     metFORMIN  mg 24 hr tablet; Take 1 tablet (500 mg) by mouth 2 times daily (morning and late afternoon). Takes once daily  Hyperlipidemia, unspecified hyperlipidemia type  -     simvastatin (Zocor) 20 mg tablet; Take 1 tablet (20 mg) by mouth once daily.  Vitamin D deficiency  ASCVD (arteriosclerotic cardiovascular disease)  Benign essential hypertension  -     losartan (Cozaar) 25 mg tablet; Take 1 tablet (25 mg) by mouth once daily.     DM  Repeat A1C at the next visit  Will start taking Jardiance next week  Continue with Metformin    HLD  On statins  High ASCVD risk  Seeing a cardiologist    HTN  Stable  No changes in meds      Immunizations:  Immunization History   Administered Date(s) Administered    DTaP, Unspecified 06/26/2006    Flu vaccine (IIV4), preservative free *Check age/dose* 11/30/2015, 10/09/2018    Flu vaccine, quadrivalent, high-dose, preservative free, age 65y+ (FLUZONE) 10/14/2022, 11/29/2023    Influenza, High Dose Seasonal, Preservative Free 12/09/2019, 10/12/2020    Influenza, Unspecified 12/06/2001, 11/30/2010, 11/30/2011, 08/29/2012, 02/27/2013, 11/20/2013, 11/21/2014, 11/30/2015, 11/16/2016, 01/17/2018    Pfizer Purple Cap SARS-CoV-2 09/23/2021    Pneumococcal conjugate vaccine, 13-valent (PREVNAR 13) 08/14/2017    Pneumococcal polysaccharide vaccine, 23-valent, age 2 years and older (PNEUMOVAX 23) 05/26/2010    Tdap vaccine, age 7 year and older (BOOSTRIX, ADACEL) 06/26/2005, 04/04/2022     Please follow up in 3 months

## 2024-06-13 DIAGNOSIS — E11.9 TYPE 2 DIABETES MELLITUS WITHOUT COMPLICATION, WITHOUT LONG-TERM CURRENT USE OF INSULIN (MULTI): ICD-10-CM

## 2024-06-15 RX ORDER — METFORMIN HYDROCHLORIDE 500 MG/1
500 TABLET, EXTENDED RELEASE ORAL
Qty: 90 TABLET | Refills: 1 | Status: SHIPPED | OUTPATIENT
Start: 2024-06-15

## 2024-06-26 ENCOUNTER — APPOINTMENT (OUTPATIENT)
Dept: PRIMARY CARE | Facility: CLINIC | Age: 69
End: 2024-06-26
Payer: MEDICARE

## 2024-06-27 DIAGNOSIS — I25.10 ASCVD (ARTERIOSCLEROTIC CARDIOVASCULAR DISEASE): ICD-10-CM

## 2024-06-27 RX ORDER — ASPIRIN 81 MG/1
81 TABLET ORAL DAILY
Qty: 90 TABLET | Refills: 0 | Status: SHIPPED | OUTPATIENT
Start: 2024-06-27

## 2024-08-22 DIAGNOSIS — E55.9 VITAMIN D DEFICIENCY: ICD-10-CM

## 2024-08-22 RX ORDER — ACETAMINOPHEN 500 MG
2000 TABLET ORAL DAILY
Qty: 90 CAPSULE | Refills: 0 | Status: SHIPPED | OUTPATIENT
Start: 2024-08-22

## 2024-08-23 ENCOUNTER — APPOINTMENT (OUTPATIENT)
Dept: CARDIOLOGY | Facility: CLINIC | Age: 69
End: 2024-08-23
Payer: MEDICARE

## 2024-08-23 DIAGNOSIS — I50.30 HEART FAILURE WITH PRESERVED EJECTION FRACTION, UNSPECIFIED HF CHRONICITY (MULTI): Primary | ICD-10-CM

## 2024-08-23 PROCEDURE — 4010F ACE/ARB THERAPY RXD/TAKEN: CPT

## 2024-08-23 PROCEDURE — 99213 OFFICE O/P EST LOW 20 MIN: CPT

## 2024-08-23 PROCEDURE — 3062F POS MACROALBUMINURIA REV: CPT

## 2024-08-23 NOTE — PROGRESS NOTES
Chief Complaint:   FUV jardiance     History Of Present Illness:    Brayden George is a 68 y.o. male with familial heart disease, HFpEF pre-diabetes, HTN, HLD presenting for tele-visit to discuss blood work and medications.     He reports to feeling well. However, stopped taking Jardiance because it was making him urinate to often. He states that he tried for 1 day. Patient denies chest pain and angina.  Pt denies shortness of breath, dyspnea on exertion, orthopnea, and paroxysmal nocturnal dyspnea.  Pt denies worsening lower extremity edema.  Pt denies palpitations or syncope.  No recent falls.  No fever or chills.  No cough.  No change in bowel or bladder habits.  No sick contacts.  No recent travel.    Past Medical History:  He has a past medical history of Acute prostatitis (04/06/2013), Acute sinusitis, unspecified (02/21/2013), Body mass index (BMI) 37.0-37.9, adult (07/03/2019), Body mass index (BMI) 38.0-38.9, adult (10/01/2021), Body mass index (BMI) 39.0-39.9, adult (04/04/2022), Body mass index (BMI)40.0-44.9, adult (01/05/2021), Personal history of other diseases of the respiratory system (02/21/2013), Personal history of other endocrine, nutritional and metabolic disease (09/05/2019), Personal history of other endocrine, nutritional and metabolic disease (04/04/2022), Personal history of other specified conditions (08/21/2014), and Personal history of other specified conditions (11/21/2014).    Past Surgical History:  He has a past surgical history that includes Colonoscopy (05/23/2013).      Social History:  He reports that he has never smoked. He has never used smokeless tobacco. He reports current alcohol use of about 1.0 standard drink of alcohol per week. He reports that he does not use drugs.    Family History:  Family History   Problem Relation Name Age of Onset    Heart disease Mother      Cirrhosis Father      Heart disease Father      Hypertension Sister      Hypertension Brother      Prostate  cancer Other          Allergies:  Ace inhibitors    Outpatient Medications:  Current Outpatient Medications   Medication Instructions    aspirin 81 mg, oral, Daily    azelastine (Astelin) 137 mcg (0.1 %) nasal spray 2 sprays, Each Nostril, 2 times daily    cholecalciferol (VITAMIN D3) 50 mcg, oral, Daily    empagliflozin (JARDIANCE) 10 mg, oral, Daily    losartan (COZAAR) 25 mg, oral, Daily    metFORMIN XR (GLUCOPHAGE-XR) 500 mg, oral, Daily with evening meal    naproxen (NAPROSYN) 500 mg, oral, Daily RT    omega-3 acid ethyl esters (LOVAZA) 2 g, oral, 2 times daily    simvastatin (ZOCOR) 20 mg, oral, Daily    tamsulosin (FLOMAX) 0.8 mg, oral, Daily          Last Labs:  CBC -  Lab Results   Component Value Date    WBC 4.6 08/10/2023    HGB 12.9 (L) 08/10/2023    HCT 38.8 (L) 08/10/2023    MCV 88 08/10/2023     08/10/2023       CMP -  Lab Results   Component Value Date    CALCIUM 9.4 05/23/2024    PHOS 3.8 11/05/2019    PROT 6.9 03/18/2024    ALBUMIN 4.3 03/18/2024    AST 20 03/18/2024    ALT 28 03/18/2024    ALKPHOS 63 03/18/2024    BILITOT 0.6 03/18/2024       LIPID PANEL -   Lab Results   Component Value Date    CHOL 142 08/10/2023    TRIG 88 08/10/2023    HDL 57.4 08/10/2023    CHHDL 2.5 08/10/2023    LDLF 67 08/10/2023    VLDL 18 08/10/2023       RENAL FUNCTION PANEL -   Lab Results   Component Value Date    GLUCOSE 174 (H) 05/23/2024     05/23/2024    K 3.9 05/23/2024     05/23/2024    CO2 27 05/23/2024    ANIONGAP 14 05/23/2024    BUN 11 05/23/2024    CREATININE 0.91 05/23/2024    GFRMALE >90 08/10/2023    CALCIUM 9.4 05/23/2024    PHOS 3.8 11/05/2019    ALBUMIN 4.3 03/18/2024        Lab Results   Component Value Date    BNP 9 05/23/2024    HGBA1C 6.9 (A) 03/13/2024     Last Cardiology Tests:  ECG:  ECG 12 Lead 03/13/2024  NSR 83 bpm     4/12/2024 Echocardiogram CONCLUSIONS:   1. Left ventricular systolic function is normal with a 55-60% estimated ejection fraction.   2. Spectral Doppler  shows an impaired relaxation pattern of left ventricular diastolic filling.   3. Moderately increased left ventricular septal thickness.   4. The inferior vena cava appears to be of normal size. There is IVC inspiratory collapse greater than 50%.     4/12/2024 Exercise Stress Echocardiogram:  Summary:   1. Adequate level of stress achieved.   2. The patient exercised to stage II on a Clarence protocol for 4 minutes and 15 seconds, achieving 6 METS. The peak heart rate achieved was 155 bpm, which was 103 % of the age predicted target heart rate.   3. There were no stress-induced wall motion abnormalities. This is a negative stress echo test for ischemia.   4. No electrocardiographic, echocardiographic or clinical evidence for ischemia at a maximal workload.      Assessment/Plan   Brayden George is a 68 y.o. male with familial heart disease, HFpEF pre-diabetes, HTN, HLD presenting for tele-visit to discuss blood work and medications.     He reports to feeling well. However, stopped taking Jardiance because it was making him urinate to often. He states that he tried for 1 day. He denies cardiac symptoms.     Will have him follow up in 3-5 months or sooner if he has cardiac complaints.     Documentation:   Mode: Telephone visit  Consent: This visit was initiated by the patient. Audio and visual communication was utilized in real-time. I confirmed understanding of risks and benefits of telehealth visits and obtained consent to proceed with the telemedicine visit.  Location of Patient: Home of patient  Time-Based Billing Justifications:   Charting in Epic  Patient visit (including performing a medically appropriate exam)  Obtaining history (or reviewing separately obtained history)  Counseling/educating the patient/family/caregiver  Ordering/interpreting (medications, tests, procedures)  Reviewing (chart, labs, and other clinical notes)     Current Patient Location: [place]    Time spent with patient was 20 minutes of which  greater than 50 percent was spent counseling and coordinating care.      Elisabet Alejandre, APRN-CNP

## 2024-09-09 DIAGNOSIS — I25.10 ASCVD (ARTERIOSCLEROTIC CARDIOVASCULAR DISEASE): ICD-10-CM

## 2024-09-09 RX ORDER — ASPIRIN 81 MG/1
81 TABLET ORAL DAILY
Qty: 90 TABLET | Refills: 0 | Status: SHIPPED | OUTPATIENT
Start: 2024-09-09 | End: 2024-09-13 | Stop reason: SDUPTHER

## 2024-09-13 ENCOUNTER — APPOINTMENT (OUTPATIENT)
Dept: PRIMARY CARE | Facility: CLINIC | Age: 69
End: 2024-09-13
Payer: MEDICARE

## 2024-09-13 ENCOUNTER — LAB (OUTPATIENT)
Dept: LAB | Facility: LAB | Age: 69
End: 2024-09-13
Payer: MEDICARE

## 2024-09-13 VITALS
SYSTOLIC BLOOD PRESSURE: 99 MMHG | DIASTOLIC BLOOD PRESSURE: 66 MMHG | HEIGHT: 73 IN | HEART RATE: 91 BPM | WEIGHT: 288 LBS | BODY MASS INDEX: 38.17 KG/M2

## 2024-09-13 DIAGNOSIS — E78.5 HYPERLIPIDEMIA, UNSPECIFIED HYPERLIPIDEMIA TYPE: ICD-10-CM

## 2024-09-13 DIAGNOSIS — N40.1 BPH WITH OBSTRUCTION/LOWER URINARY TRACT SYMPTOMS: ICD-10-CM

## 2024-09-13 DIAGNOSIS — N13.8 BPH WITH OBSTRUCTION/LOWER URINARY TRACT SYMPTOMS: ICD-10-CM

## 2024-09-13 DIAGNOSIS — E11.9 TYPE 2 DIABETES MELLITUS WITHOUT COMPLICATION, WITHOUT LONG-TERM CURRENT USE OF INSULIN (MULTI): ICD-10-CM

## 2024-09-13 DIAGNOSIS — Z23 FLU VACCINE NEED: ICD-10-CM

## 2024-09-13 DIAGNOSIS — I25.10 ASCVD (ARTERIOSCLEROTIC CARDIOVASCULAR DISEASE): ICD-10-CM

## 2024-09-13 DIAGNOSIS — M54.40 ACUTE RIGHT-SIDED LOW BACK PAIN WITH SCIATICA, SCIATICA LATERALITY UNSPECIFIED: ICD-10-CM

## 2024-09-13 DIAGNOSIS — M54.12 CERVICAL RADICULOPATHY: ICD-10-CM

## 2024-09-13 DIAGNOSIS — I10 BENIGN ESSENTIAL HYPERTENSION: ICD-10-CM

## 2024-09-13 DIAGNOSIS — E11.9 TYPE 2 DIABETES MELLITUS WITHOUT COMPLICATION, WITHOUT LONG-TERM CURRENT USE OF INSULIN (MULTI): Primary | ICD-10-CM

## 2024-09-13 DIAGNOSIS — E55.9 VITAMIN D DEFICIENCY: ICD-10-CM

## 2024-09-13 LAB
CHOLEST SERPL-MCNC: 140 MG/DL (ref 0–199)
CHOLESTEROL/HDL RATIO: 2.4
HDLC SERPL-MCNC: 58.3 MG/DL
LDLC SERPL CALC-MCNC: 62 MG/DL
NON HDL CHOLESTEROL: 82 MG/DL (ref 0–149)
POC HEMOGLOBIN A1C: 7.6 % (ref 4.2–6.5)
TRIGL SERPL-MCNC: 101 MG/DL (ref 0–149)
VLDL: 20 MG/DL (ref 0–40)

## 2024-09-13 PROCEDURE — G0008 ADMIN INFLUENZA VIRUS VAC: HCPCS | Performed by: INTERNAL MEDICINE

## 2024-09-13 PROCEDURE — 90662 IIV NO PRSV INCREASED AG IM: CPT | Performed by: INTERNAL MEDICINE

## 2024-09-13 PROCEDURE — 36415 COLL VENOUS BLD VENIPUNCTURE: CPT

## 2024-09-13 PROCEDURE — 4010F ACE/ARB THERAPY RXD/TAKEN: CPT | Performed by: INTERNAL MEDICINE

## 2024-09-13 PROCEDURE — 3074F SYST BP LT 130 MM HG: CPT | Performed by: INTERNAL MEDICINE

## 2024-09-13 PROCEDURE — 83036 HEMOGLOBIN GLYCOSYLATED A1C: CPT | Performed by: INTERNAL MEDICINE

## 2024-09-13 PROCEDURE — 3062F POS MACROALBUMINURIA REV: CPT | Performed by: INTERNAL MEDICINE

## 2024-09-13 PROCEDURE — G2211 COMPLEX E/M VISIT ADD ON: HCPCS | Performed by: INTERNAL MEDICINE

## 2024-09-13 PROCEDURE — 3008F BODY MASS INDEX DOCD: CPT | Performed by: INTERNAL MEDICINE

## 2024-09-13 PROCEDURE — 80061 LIPID PANEL: CPT

## 2024-09-13 PROCEDURE — 1036F TOBACCO NON-USER: CPT | Performed by: INTERNAL MEDICINE

## 2024-09-13 PROCEDURE — 99214 OFFICE O/P EST MOD 30 MIN: CPT | Performed by: INTERNAL MEDICINE

## 2024-09-13 PROCEDURE — 3078F DIAST BP <80 MM HG: CPT | Performed by: INTERNAL MEDICINE

## 2024-09-13 RX ORDER — ASPIRIN 81 MG/1
81 TABLET ORAL DAILY
Qty: 90 TABLET | Refills: 0 | Status: SHIPPED | OUTPATIENT
Start: 2024-09-13

## 2024-09-13 RX ORDER — TAMSULOSIN HYDROCHLORIDE 0.4 MG/1
0.8 CAPSULE ORAL DAILY
Qty: 180 CAPSULE | Refills: 3 | Status: SHIPPED | OUTPATIENT
Start: 2024-09-13

## 2024-09-13 RX ORDER — LOSARTAN POTASSIUM 25 MG/1
25 TABLET ORAL DAILY
Qty: 90 TABLET | Refills: 0 | Status: SHIPPED | OUTPATIENT
Start: 2024-09-13 | End: 2024-12-12

## 2024-09-13 RX ORDER — NAPROXEN 500 MG/1
500 TABLET ORAL
Qty: 90 TABLET | Refills: 0 | Status: SHIPPED | OUTPATIENT
Start: 2024-09-13

## 2024-09-13 RX ORDER — METFORMIN HYDROCHLORIDE 500 MG/1
1000 TABLET, EXTENDED RELEASE ORAL
Qty: 90 TABLET | Refills: 1 | Status: SHIPPED | OUTPATIENT
Start: 2024-09-13

## 2024-09-13 RX ORDER — METFORMIN HYDROCHLORIDE 500 MG/1
500 TABLET, EXTENDED RELEASE ORAL
Qty: 90 TABLET | Refills: 1 | Status: SHIPPED | OUTPATIENT
Start: 2024-09-13 | End: 2024-09-13 | Stop reason: WASHOUT

## 2024-09-13 RX ORDER — DAPAGLIFLOZIN 5 MG/1
5 TABLET, FILM COATED ORAL DAILY
Qty: 90 TABLET | Refills: 1 | Status: SHIPPED | OUTPATIENT
Start: 2024-09-13 | End: 2025-03-12

## 2024-09-13 RX ORDER — OMEGA-3-ACID ETHYL ESTERS 1 G/1
2 CAPSULE, LIQUID FILLED ORAL 2 TIMES DAILY
Qty: 360 CAPSULE | Refills: 3 | Status: SHIPPED | OUTPATIENT
Start: 2024-09-13

## 2024-09-13 RX ORDER — GABAPENTIN 100 MG/1
100 CAPSULE ORAL NIGHTLY
Qty: 30 CAPSULE | Refills: 5 | Status: SHIPPED | OUTPATIENT
Start: 2024-09-13 | End: 2025-03-12

## 2024-09-13 RX ORDER — SIMVASTATIN 20 MG/1
20 TABLET, FILM COATED ORAL DAILY
Qty: 90 TABLET | Refills: 3 | Status: SHIPPED | OUTPATIENT
Start: 2024-09-13

## 2024-09-13 RX ORDER — ACETAMINOPHEN 500 MG
2000 TABLET ORAL DAILY
Qty: 90 CAPSULE | Refills: 0 | Status: SHIPPED | OUTPATIENT
Start: 2024-09-13

## 2024-09-13 ASSESSMENT — LIFESTYLE VARIABLES
AUDIT-C TOTAL SCORE: 1
HOW OFTEN DO YOU HAVE A DRINK CONTAINING ALCOHOL: MONTHLY OR LESS
HOW MANY STANDARD DRINKS CONTAINING ALCOHOL DO YOU HAVE ON A TYPICAL DAY: 1 OR 2
SKIP TO QUESTIONS 9-10: 1
HOW OFTEN DO YOU HAVE SIX OR MORE DRINKS ON ONE OCCASION: NEVER

## 2024-09-13 ASSESSMENT — PATIENT HEALTH QUESTIONNAIRE - PHQ9
SUM OF ALL RESPONSES TO PHQ9 QUESTIONS 1 AND 2: 0
1. LITTLE INTEREST OR PLEASURE IN DOING THINGS: NOT AT ALL
2. FEELING DOWN, DEPRESSED OR HOPELESS: NOT AT ALL

## 2024-09-13 NOTE — PROGRESS NOTES
"Subjective   Patient ID: Brayden George is a 68 y.o. male who presents for Follow-up (3 month f/u).  Here for med refill   Has acute L cervical radiculopathy after working on garage door with heavy lifting. Naprosyn helps. Does have neck pain. Has been ongoing for 4 weeks. Refused PT at this time.   Otherwise no chest pain, OCONNELL, abd pain or changes in BM or urination.         Review of Systems   All other systems reviewed and are negative.      Objective   Physical Exam  Cardiovascular:      Rate and Rhythm: Normal rate and regular rhythm.      Pulses: Normal pulses.      Heart sounds: Normal heart sounds.   Pulmonary:      Effort: Pulmonary effort is normal.      Breath sounds: Normal breath sounds.   Abdominal:      General: Abdomen is flat. Bowel sounds are normal.      Palpations: Abdomen is soft.   Neurological:      Mental Status: He is alert.       BP 99/66 (BP Location: Left arm, Patient Position: Sitting, BP Cuff Size: Large adult)   Pulse 91   Ht 1.854 m (6' 1\")   Wt 131 kg (288 lb)   BMI 38.00 kg/m²      Assessment/Plan   Problem List Items Addressed This Visit    None      T2DM, HTN, HLD       Cervical radiculopathy   -CT with degenerative changes   -start gabapentin, continue naprosyn (with food)  -will consider future PT      DM, HLD    Io A1c, 7.4, will start farxiga and increase metformin   Foot exam UTD. Please make appt with your eye doc for Utd eye exam   Lipid panel ordered     HTN   Continue current med     HM   Labs UTD   Cscope UTD, PSA UTD   Vaccines - flu shot today, will get the rest at pharmacy     Rtc 4 months     "

## 2024-09-18 ENCOUNTER — APPOINTMENT (OUTPATIENT)
Dept: UROLOGY | Facility: HOSPITAL | Age: 69
End: 2024-09-18
Payer: MEDICARE

## 2024-10-08 NOTE — PROGRESS NOTES
[facility]  Outpatient Clinic Note    Subjective   Brayden George is a 68 y.o. male 6 month FUV     History of Present Illness   Patient with history of 3+4=7 prostate cancer s/p ADT in 2020 and radiation therapy completed in July 2020. He reports he is voiding well and continues to take Tamsulosin 0.8 mg daily. He denies any dysuria, gross hematuria, flank pain, fevers or chills. He continues to use TriMix 30/1/10 using up to 50 units.     Lab Results   Component Value Date    PSA 0.17 03/18/2024    PSA 0.35 09/14/2023    PSA 0.53 03/09/2023    PSA 0.32 09/12/2022    PSA 0.30 03/21/2022    PSA 0.29 12/22/2021    PSA 0.66 10/11/2021    PSA 0.79 07/13/2021    PSA 0.55 04/13/2021    PSA 0.67 09/29/2020       Past Medical History and Surgical History   Past Medical History:   Diagnosis Date    Acute prostatitis 04/06/2013    Acute bacterial prostatitis    Acute sinusitis, unspecified 02/21/2013    Acute sinusitis    Body mass index (BMI) 37.0-37.9, adult 07/03/2019    BMI 37.0-37.9, adult    Body mass index (BMI) 38.0-38.9, adult 10/01/2021    BMI 38.0-38.9,adult    Body mass index (BMI) 39.0-39.9, adult 04/04/2022    Body mass index (BMI) of 39.0 to 39.9 in adult    Body mass index (BMI)40.0-44.9, adult 01/05/2021    Body mass index (BMI) of 40.0 to 44.9 in adult    Personal history of other diseases of the respiratory system 02/21/2013    History of upper respiratory infection    Personal history of other endocrine, nutritional and metabolic disease 09/05/2019    History of obesity    Personal history of other endocrine, nutritional and metabolic disease 04/04/2022    History of morbid obesity    Personal history of other specified conditions 08/21/2014    History of chest pain    Personal history of other specified conditions 11/21/2014    History of diarrhea     Past Surgical History:   Procedure Laterality Date    COLONOSCOPY  05/23/2013    Complete Colonoscopy       Medications  Current Outpatient  Medications on File Prior to Visit   Medication Sig Dispense Refill    aspirin 81 mg EC tablet Take 1 tablet (81 mg) by mouth once daily. 90 tablet 0    azelastine (Astelin) 137 mcg (0.1 %) nasal spray Administer 2 sprays into each nostril 2 times a day. 72 mL 0    cholecalciferol (Vitamin D3) 50 mcg (2,000 unit) capsule Take 1 capsule (50 mcg) by mouth once daily. 90 capsule 0    dapagliflozin propanediol (Farxiga) 5 mg Take 1 tablet (5 mg) by mouth once daily. 90 tablet 1    gabapentin (Neurontin) 100 mg capsule Take 1 capsule (100 mg) by mouth once daily at bedtime. 30 capsule 5    losartan (Cozaar) 25 mg tablet Take 1 tablet (25 mg) by mouth once daily. 90 tablet 0    metFORMIN  mg 24 hr tablet Take 2 tablets (1,000 mg) by mouth once daily in the evening. Take with meals. 90 tablet 1    naproxen (Naprosyn) 500 mg tablet Take 1 tablet (500 mg) by mouth once daily. 90 tablet 0    omega-3 acid ethyl esters (Lovaza) 1 gram capsule Take 2 capsules (2 g) by mouth 2 times a day. 360 capsule 3    simvastatin (Zocor) 20 mg tablet Take 1 tablet (20 mg) by mouth once daily. 90 tablet 3    tamsulosin (Flomax) 0.4 mg 24 hr capsule Take 2 capsules (0.8 mg) by mouth once daily. 180 capsule 3     No current facility-administered medications on file prior to visit.       Objective   Physicial Exam  General: Well developed, well nourished, alert and cooperative, appears in no acute distress  Eyes: Non-injected conjunctiva, sclera clear, no proptosis  Cardiac: Extremities are warm and well perfused. No edema, cyanosis or pallor.   Lungs: Breathing is easy, non-labored. Speaking in clear and complete sentences. Normal diaphragmatic movement.  MSK: Ambulatory with steady gait, unassisted  Neuro: alert and oriented to person, place and time  Psych: Demonstrates good judgement and reason, without hallucinations, abnormal affect or abnormal behaviors.  Skin: no obvious lesions, no rashes.    Review of Systems  All other systems  have been reviewed and are negative for complaint.      Assessment and Plan   1) Prostate Cancer  - PSA remains low  - PSA now and in 6 months     2) BPH   - PVR remains low  - Continue with Tamsulosin 0.8 mg daily, refilled today      3) ED  - TriMix increase to  (30/2/20) start with 15 units   - Declines info on penile prosthesis  - No priapism      Follow-up 6 months with labs, or sooner if needed.     All questions and concerns were addressed. Patient verbalizes understanding and has no other questions at this time.   If you have any questions about your care, do not hesitate to call and leave a message, we return calls in a timely manner.    Elena Bundy-- GIANNI MARIA  Office Phone:  [phone]

## 2024-10-09 ENCOUNTER — APPOINTMENT (OUTPATIENT)
Dept: UROLOGY | Facility: CLINIC | Age: 69
End: 2024-10-09
Payer: MEDICARE

## 2024-10-09 DIAGNOSIS — N52.35 ERECTILE DYSFUNCTION FOLLOWING RADIATION THERAPY: ICD-10-CM

## 2024-10-09 DIAGNOSIS — N40.1 BPH WITH OBSTRUCTION/LOWER URINARY TRACT SYMPTOMS: ICD-10-CM

## 2024-10-09 DIAGNOSIS — N13.8 BPH WITH OBSTRUCTION/LOWER URINARY TRACT SYMPTOMS: ICD-10-CM

## 2024-10-09 DIAGNOSIS — C61 CANCER OF PROSTATE (MULTI): Primary | ICD-10-CM

## 2024-10-09 PROCEDURE — 4010F ACE/ARB THERAPY RXD/TAKEN: CPT | Performed by: NURSE PRACTITIONER

## 2024-10-09 PROCEDURE — G2211 COMPLEX E/M VISIT ADD ON: HCPCS | Performed by: NURSE PRACTITIONER

## 2024-10-09 PROCEDURE — 99213 OFFICE O/P EST LOW 20 MIN: CPT | Performed by: NURSE PRACTITIONER

## 2024-10-09 PROCEDURE — 51798 US URINE CAPACITY MEASURE: CPT | Performed by: NURSE PRACTITIONER

## 2024-10-09 PROCEDURE — 3062F POS MACROALBUMINURIA REV: CPT | Performed by: NURSE PRACTITIONER

## 2024-10-09 PROCEDURE — 3048F LDL-C <100 MG/DL: CPT | Performed by: NURSE PRACTITIONER

## 2024-10-09 RX ORDER — TAMSULOSIN HYDROCHLORIDE 0.4 MG/1
0.8 CAPSULE ORAL DAILY
Qty: 180 CAPSULE | Refills: 3 | Status: SHIPPED | OUTPATIENT
Start: 2024-10-09

## 2024-12-12 DIAGNOSIS — I10 BENIGN ESSENTIAL HYPERTENSION: ICD-10-CM

## 2024-12-12 RX ORDER — LOSARTAN POTASSIUM 25 MG/1
25 TABLET ORAL DAILY
Qty: 90 TABLET | Refills: 3 | Status: SHIPPED | OUTPATIENT
Start: 2024-12-12

## 2025-01-10 ENCOUNTER — APPOINTMENT (OUTPATIENT)
Dept: PRIMARY CARE | Facility: CLINIC | Age: 70
End: 2025-01-10
Payer: MEDICARE

## 2025-01-14 DIAGNOSIS — E55.9 VITAMIN D DEFICIENCY: ICD-10-CM

## 2025-01-14 RX ORDER — ACETAMINOPHEN 500 MG
TABLET ORAL DAILY
Qty: 90 CAPSULE | Refills: 3 | Status: SHIPPED | OUTPATIENT
Start: 2025-01-14

## 2025-01-20 ENCOUNTER — LAB (OUTPATIENT)
Dept: LAB | Facility: LAB | Age: 70
End: 2025-01-20
Payer: MEDICARE

## 2025-01-20 ENCOUNTER — APPOINTMENT (OUTPATIENT)
Dept: PRIMARY CARE | Facility: CLINIC | Age: 70
End: 2025-01-20
Payer: MEDICARE

## 2025-01-20 VITALS
DIASTOLIC BLOOD PRESSURE: 73 MMHG | HEART RATE: 90 BPM | HEIGHT: 73 IN | BODY MASS INDEX: 37.77 KG/M2 | WEIGHT: 285 LBS | SYSTOLIC BLOOD PRESSURE: 113 MMHG

## 2025-01-20 DIAGNOSIS — I10 BENIGN ESSENTIAL HYPERTENSION: ICD-10-CM

## 2025-01-20 DIAGNOSIS — M54.12 CERVICAL RADICULOPATHY: ICD-10-CM

## 2025-01-20 DIAGNOSIS — Z00.00 ROUTINE ADULT HEALTH MAINTENANCE: ICD-10-CM

## 2025-01-20 DIAGNOSIS — N13.8 BPH WITH OBSTRUCTION/LOWER URINARY TRACT SYMPTOMS: ICD-10-CM

## 2025-01-20 DIAGNOSIS — E11.9 TYPE 2 DIABETES MELLITUS WITHOUT COMPLICATION, WITHOUT LONG-TERM CURRENT USE OF INSULIN (MULTI): ICD-10-CM

## 2025-01-20 DIAGNOSIS — E55.9 VITAMIN D DEFICIENCY: ICD-10-CM

## 2025-01-20 DIAGNOSIS — T78.40XD ALLERGY, SUBSEQUENT ENCOUNTER: ICD-10-CM

## 2025-01-20 DIAGNOSIS — E11.9 TYPE 2 DIABETES MELLITUS WITHOUT COMPLICATION, WITHOUT LONG-TERM CURRENT USE OF INSULIN (MULTI): Primary | ICD-10-CM

## 2025-01-20 DIAGNOSIS — N40.1 BPH WITH OBSTRUCTION/LOWER URINARY TRACT SYMPTOMS: ICD-10-CM

## 2025-01-20 PROBLEM — T78.40XA ALLERGIES: Status: ACTIVE | Noted: 2025-01-20

## 2025-01-20 LAB
25(OH)D3 SERPL-MCNC: 39 NG/ML (ref 30–100)
ALBUMIN SERPL BCP-MCNC: 4.5 G/DL (ref 3.4–5)
ALP SERPL-CCNC: 63 U/L (ref 33–136)
ALT SERPL W P-5'-P-CCNC: 19 U/L (ref 10–52)
ANION GAP SERPL CALC-SCNC: 13 MMOL/L (ref 10–20)
AST SERPL W P-5'-P-CCNC: 15 U/L (ref 9–39)
BILIRUB SERPL-MCNC: 0.5 MG/DL (ref 0–1.2)
BUN SERPL-MCNC: 13 MG/DL (ref 6–23)
CALCIUM SERPL-MCNC: 10 MG/DL (ref 8.6–10.6)
CHLORIDE SERPL-SCNC: 103 MMOL/L (ref 98–107)
CO2 SERPL-SCNC: 31 MMOL/L (ref 21–32)
CREAT SERPL-MCNC: 0.91 MG/DL (ref 0.5–1.3)
EGFRCR SERPLBLD CKD-EPI 2021: >90 ML/MIN/1.73M*2
GLUCOSE SERPL-MCNC: 133 MG/DL (ref 74–99)
POTASSIUM SERPL-SCNC: 4.4 MMOL/L (ref 3.5–5.3)
PROT SERPL-MCNC: 7.5 G/DL (ref 6.4–8.2)
SODIUM SERPL-SCNC: 143 MMOL/L (ref 136–145)
TSH SERPL-ACNC: 1.96 MIU/L (ref 0.44–3.98)

## 2025-01-20 PROCEDURE — 80053 COMPREHEN METABOLIC PANEL: CPT

## 2025-01-20 PROCEDURE — 84443 ASSAY THYROID STIM HORMONE: CPT

## 2025-01-20 PROCEDURE — 3008F BODY MASS INDEX DOCD: CPT | Performed by: INTERNAL MEDICINE

## 2025-01-20 PROCEDURE — 85027 COMPLETE CBC AUTOMATED: CPT

## 2025-01-20 PROCEDURE — 4010F ACE/ARB THERAPY RXD/TAKEN: CPT | Performed by: INTERNAL MEDICINE

## 2025-01-20 PROCEDURE — 1170F FXNL STATUS ASSESSED: CPT | Performed by: INTERNAL MEDICINE

## 2025-01-20 PROCEDURE — 3078F DIAST BP <80 MM HG: CPT | Performed by: INTERNAL MEDICINE

## 2025-01-20 PROCEDURE — G0439 PPPS, SUBSEQ VISIT: HCPCS | Performed by: INTERNAL MEDICINE

## 2025-01-20 PROCEDURE — 1036F TOBACCO NON-USER: CPT | Performed by: INTERNAL MEDICINE

## 2025-01-20 PROCEDURE — 3074F SYST BP LT 130 MM HG: CPT | Performed by: INTERNAL MEDICINE

## 2025-01-20 PROCEDURE — 1124F ACP DISCUSS-NO DSCNMKR DOCD: CPT | Performed by: INTERNAL MEDICINE

## 2025-01-20 PROCEDURE — 99214 OFFICE O/P EST MOD 30 MIN: CPT | Performed by: INTERNAL MEDICINE

## 2025-01-20 PROCEDURE — 83036 HEMOGLOBIN GLYCOSYLATED A1C: CPT

## 2025-01-20 PROCEDURE — 82306 VITAMIN D 25 HYDROXY: CPT

## 2025-01-20 PROCEDURE — 1159F MED LIST DOCD IN RCRD: CPT | Performed by: INTERNAL MEDICINE

## 2025-01-20 RX ORDER — TAMSULOSIN HYDROCHLORIDE 0.4 MG/1
0.8 CAPSULE ORAL DAILY
Qty: 180 CAPSULE | Refills: 3 | Status: SHIPPED | OUTPATIENT
Start: 2025-01-20

## 2025-01-20 RX ORDER — AZELASTINE 1 MG/ML
2 SPRAY, METERED NASAL 2 TIMES DAILY
Qty: 72 ML | Refills: 0 | Status: SHIPPED | OUTPATIENT
Start: 2025-01-20 | End: 2025-04-20

## 2025-01-20 RX ORDER — LORATADINE 10 MG/1
10 TABLET ORAL DAILY
Qty: 30 TABLET | Refills: 11 | Status: SHIPPED | OUTPATIENT
Start: 2025-01-20 | End: 2026-01-20

## 2025-01-20 RX ORDER — GABAPENTIN 100 MG/1
100 CAPSULE ORAL NIGHTLY
Qty: 30 CAPSULE | Refills: 5 | Status: SHIPPED | OUTPATIENT
Start: 2025-01-20 | End: 2025-07-19

## 2025-01-20 ASSESSMENT — PATIENT HEALTH QUESTIONNAIRE - PHQ9
2. FEELING DOWN, DEPRESSED OR HOPELESS: NOT AT ALL
1. LITTLE INTEREST OR PLEASURE IN DOING THINGS: NOT AT ALL
2. FEELING DOWN, DEPRESSED OR HOPELESS: NOT AT ALL
SUM OF ALL RESPONSES TO PHQ9 QUESTIONS 1 AND 2: 0
1. LITTLE INTEREST OR PLEASURE IN DOING THINGS: NOT AT ALL
SUM OF ALL RESPONSES TO PHQ9 QUESTIONS 1 AND 2: 0

## 2025-01-20 ASSESSMENT — ENCOUNTER SYMPTOMS
FEVER: 0
DEPRESSION: 0
DYSURIA: 0
DIARRHEA: 0
DIZZINESS: 0
LOSS OF SENSATION IN FEET: 0
HEMATURIA: 1
MYALGIAS: 1
FATIGUE: 0
OCCASIONAL FEELINGS OF UNSTEADINESS: 0
SHORTNESS OF BREATH: 0
CONSTIPATION: 0

## 2025-01-20 ASSESSMENT — LIFESTYLE VARIABLES
HOW MANY STANDARD DRINKS CONTAINING ALCOHOL DO YOU HAVE ON A TYPICAL DAY: 1 OR 2
SKIP TO QUESTIONS 9-10: 1
AUDIT-C TOTAL SCORE: 1
HOW OFTEN DO YOU HAVE SIX OR MORE DRINKS ON ONE OCCASION: NEVER
HOW OFTEN DO YOU HAVE A DRINK CONTAINING ALCOHOL: MONTHLY OR LESS

## 2025-01-20 ASSESSMENT — ACTIVITIES OF DAILY LIVING (ADL)
TAKING_MEDICATION: INDEPENDENT
DRESSING: INDEPENDENT
GROCERY_SHOPPING: INDEPENDENT
BATHING: INDEPENDENT
MANAGING_FINANCES: INDEPENDENT
DOING_HOUSEWORK: INDEPENDENT

## 2025-01-20 NOTE — PROGRESS NOTES
Primary Care Visit Note    Patient: Brayden George, Age: 69 y.o., SEX: male , MRN:[id number],   PCP: Darlene Booth MD        Resident:  Ang Lisa DO   Preferred Pharmacy:   [facility] - [place], [place] - [address]  [address] [address]  Phone: [phone] Fax: [phone]    [id number] - [place], [place] - [address] [facility]  [address]  [address]  [facility] [address]  Phone: [phone] Fax: [phone]    [id number] - [place], [place] - [address] [facility]  [address] [facility] [address]  Phone: [phone] Fax: [phone]          Chief Complaint     Patient: Brayden George is a 69 y.o. male who presented to the clinic for   Chief Complaint   Patient presents with    Medicare Annual Wellness Visit Initial        Subjective      HPI    Brayden George is a 69 y.o. year old male patient with a PMH significant for T2DM, HFpEFHTN, HLD prostate cancer s/p ADT and radiation therapy in 2020, presents to the clinic for medicare annual wellness visit.  Patient has been experiencing some radiculopathy symptoms in his hands and feet.  Has not received the gabapentin that was ordered to him from prior visit.  Furthermore patient endorses allergy congestion in the morning when waking up.Otherwise, no acute concerns.  Continues to endorse chronic hematuria after undergoing treatment for prostate cancer.  Follows up with urology regularly.  Medicare Wellness Billing Compliance Satisfied    *This is a visual tool to show completion of required items on the day of the visit. Green checks will only appear on the date of visit.    Review all medications by prescribing practitioner or clinical pharmacist (such as prescriptions, OTCs, herbal therapies and supplements) documented in the medical record    Past Medical, Surgical, and Family History reviewed and updated in chart    Tobacco Use Reviewed     Alcohol Use Reviewed    Illicit Drug Use Reviewed    PHQ2/9    Falls in Last Year Reviewed    Home Safety Risk Factors Reviewed    Cognitive Impairment Reviewed    Patient Self Assessment and Health Status    Current Diet Reviewed    Exercise Frequency    ADL - Hearing Impairment    ADL - Bathing    ADL - Dressing    ADL - Walks in Home    IADL - Managing Finances    IADL - Grocery Shopping    IADL - Taking Medications    IADL - Doing Housework      Medications and Supplements  prescribed by me and other practitioners or clinical pharmacist (such as prescriptions, OTC's, herbal therapies and supplements) were reviewed and documented in the medical record.    Tobacco/Alcohol/Opioid use, as well as Illicit Drug Use was screened for/reviewed and documented in Social History section and medication list as appropriate    Depression Screen  PHQ 9 reviewed and discussed    HRA risk assessment was completed by the patient and was reviewed by me       Advance directives  Advanced Care Planning (including a Living Will, Healthcare POA, as well as specific end of life choices and/or directives), was discussed with the patient and/or surrogate, voluntarily, and documented in the medical record.    ROS   Review of Systems   Constitutional:  Negative for fatigue and fever.   Respiratory:  Negative for shortness of breath.    Cardiovascular:  Negative for chest pain.   Gastrointestinal:  Negative for constipation and diarrhea.   Genitourinary:  Positive for hematuria. Negative for dysuria.   Musculoskeletal:  Positive for myalgias.   Neurological:  Negative for dizziness.      Past History     PMHx:    has a past medical history of Acute prostatitis (04/06/2013), Acute sinusitis, unspecified (02/21/2013), Body mass index (BMI) 37.0-37.9, adult (07/03/2019), Body mass index (BMI) 38.0-38.9, adult (10/01/2021), Body mass index (BMI) 39.0-39.9, adult (04/04/2022), Body mass index (BMI)40.0-44.9, adult (01/05/2021),  Personal history of other diseases of the respiratory system (02/21/2013), Personal history of other endocrine, nutritional and metabolic disease (09/05/2019), Personal history of other endocrine, nutritional and metabolic disease (04/04/2022), Personal history of other specified conditions (08/21/2014), and Personal history of other specified conditions (11/21/2014).   Allergies:   Allergies   Allergen Reactions    Ace Inhibitors Unknown      Surgical Hx:   Past Surgical History:   Procedure Laterality Date    COLONOSCOPY  05/23/2013    Complete Colonoscopy      Social HX:   Social History     Tobacco Use    Smoking status: Never     Passive exposure: Past    Smokeless tobacco: Never   Substance Use Topics    Alcohol use: Yes     Alcohol/week: 1.0 standard drink of alcohol     Types: 1 Shots of liquor per week    Drug use: Never      MEDS:   Current Outpatient Medications   Medication Instructions    aspirin 81 mg, oral, Daily    azelastine (Astelin) 137 mcg (0.1 %) nasal spray 2 sprays, Each Nostril, 2 times daily    cholecalciferol (Vitamin D-3) 50 mcg (2,000 unit) capsule oral, Daily    dapagliflozin propanediol (FARXIGA) 5 mg, oral, Daily    gabapentin (NEURONTIN) 100 mg, oral, Nightly    loratadine (CLARITIN) 10 mg, oral, Daily    losartan (COZAAR) 25 mg, oral, Daily    metFORMIN XR (GLUCOPHAGE-XR) 1,000 mg, oral, Daily with evening meal    naproxen (NAPROSYN) 500 mg, oral, Daily RT    omega-3 acid ethyl esters (LOVAZA) 2 g, oral, 2 times daily    simvastatin (ZOCOR) 20 mg, oral, Daily    tamsulosin (FLOMAX) 0.8 mg, oral, Daily      Objective      Visit Vitals  /73 (BP Location: Right arm, Patient Position: Sitting, BP Cuff Size: Large adult)   Pulse 90      BMI Readings from Last 15 Encounters:   01/20/25 37.60 kg/m²   09/13/24 38.00 kg/m²   07/09/24 35.95 kg/m²   06/06/24 37.62 kg/m²   04/17/24 37.23 kg/m²   03/14/24 37.23 kg/m²   03/13/24 38.38 kg/m²   11/29/23 38.38 kg/m²   09/15/23 38.65 kg/m²    08/10/23 37.87 kg/m²   06/28/23 37.87 kg/m²   06/17/23 37.65 kg/m²   04/28/23 37.64 kg/m²   01/27/23 38.04 kg/m²   10/14/22 38.57 kg/m²      Lab Results   Component Value Date    HGBA1C 7.6 (A) 09/13/2024      Lab Results   Component Value Date    HGBA1C 7.6 (A) 09/13/2024    HGBA1C 6.9 (A) 03/13/2024    HGBA1C 7.2 (A) 11/29/2023     Lab Results   Component Value Date    LDLCALC 62 09/13/2024    CREATININE 0.91 05/23/2024      Lab Results   Component Value Date    WBC 4.6 08/10/2023    HGB 12.9 (L) 08/10/2023    HCT 38.8 (L) 08/10/2023    MCV 88 08/10/2023     08/10/2023        Chemistry    Lab Results   Component Value Date/Time     05/23/2024 1440    K 3.9 05/23/2024 1440     05/23/2024 1440    CO2 27 05/23/2024 1440    BUN 11 05/23/2024 1440    CREATININE 0.91 05/23/2024 1440    Lab Results   Component Value Date/Time    CALCIUM 9.4 05/23/2024 1440    ALKPHOS 63 03/18/2024 1413    AST 20 03/18/2024 1413    ALT 28 03/18/2024 1413    BILITOT 0.6 03/18/2024 1413             Physical Exam  Physical Exam  Constitutional:       Appearance: He is obese.   Eyes:      Extraocular Movements: Extraocular movements intact.      Conjunctiva/sclera: Conjunctivae normal.   Cardiovascular:      Rate and Rhythm: Normal rate and regular rhythm.      Pulses: Normal pulses.      Heart sounds: Normal heart sounds.   Pulmonary:      Effort: Pulmonary effort is normal.      Breath sounds: Normal breath sounds.   Neurological:      Mental Status: He is alert and oriented to person, place, and time. Mental status is at baseline.        Assessment and Plan     Assessment/Plan    The following medical issues were discussed during this encounter  :       Brayden was seen today for medicare annual wellness visit initial.  Diagnoses and all orders for this visit:  Type 2 diabetes mellitus without complication, without long-term current use of insulin (Multi) (Primary)  -A1c ordered today  -On metformin 1000 mg in the  evening and Farxiga 5 mg daily  -Ordered gabapentin   Recommended patient see eye specialist  -     Hemoglobin A1c; Future  Vitamin D deficiency  On daily vitamin D supplements  Will recheck levels  -     Vitamin D 25-Hydroxy,Total (for eval of Vitamin D levels); Future  Benign essential hypertension  Blood pressure of 113/73 in the office today  Well-controlled continue Cozaar  -     CBC; Future  -     Comprehensive metabolic panel; Future  Routine adult health maintenance  -     Tsh With Reflex To Free T4 If Abnormal; Future  Allergy, subsequent encounter  Will place an order for loratadine for congestion in the morning and refilled the patient's Astelin  -     azelastine (Astelin) 137 mcg (0.1 %) nasal spray; Administer 2 sprays into each nostril 2 times a day.  -     loratadine (Claritin) 10 mg tablet; Take 1 tablet (10 mg) by mouth once daily.  BPH with obstruction/lower urinary tract symptoms  History of prostate cancer status post ADT and radiation therapy in 2020  Patient sees urology regularly  Will refill patient's Flomax  -     tamsulosin (Flomax) 0.4 mg 24 hr capsule; Take 2 capsules (0.8 mg) by mouth once daily.           Health maintenance  The following screening tests were ordered:Routine labwork including A1c  Recommend you see an eye specialist    Immunizations: Recommend pneumococcal vaccine    Please return in 3 months or if symptoms worsen     Ang Lisa, DO  Internal Medicine, PGY- 1  01/20/25 at 3:01 PM

## 2025-01-21 LAB
ERYTHROCYTE [DISTWIDTH] IN BLOOD BY AUTOMATED COUNT: 12.8 % (ref 11.5–14.5)
EST. AVERAGE GLUCOSE BLD GHB EST-MCNC: 154 MG/DL
HBA1C MFR BLD: 7 %
HCT VFR BLD AUTO: 40.4 % (ref 41–52)
HGB BLD-MCNC: 13.6 G/DL (ref 13.5–17.5)
MCH RBC QN AUTO: 29.4 PG (ref 26–34)
MCHC RBC AUTO-ENTMCNC: 33.7 G/DL (ref 32–36)
MCV RBC AUTO: 87 FL (ref 80–100)
NRBC BLD-RTO: 0 /100 WBCS (ref 0–0)
PLATELET # BLD AUTO: 176 X10*3/UL (ref 150–450)
RBC # BLD AUTO: 4.62 X10*6/UL (ref 4.5–5.9)
WBC # BLD AUTO: 5 X10*3/UL (ref 4.4–11.3)

## 2025-01-27 DIAGNOSIS — E11.9 TYPE 2 DIABETES MELLITUS WITHOUT COMPLICATION, WITHOUT LONG-TERM CURRENT USE OF INSULIN (MULTI): ICD-10-CM

## 2025-01-27 RX ORDER — METFORMIN HYDROCHLORIDE 500 MG/1
TABLET, EXTENDED RELEASE ORAL
Qty: 90 TABLET | Refills: 3 | Status: SHIPPED | OUTPATIENT
Start: 2025-01-27

## 2025-02-17 DIAGNOSIS — I25.10 ASCVD (ARTERIOSCLEROTIC CARDIOVASCULAR DISEASE): ICD-10-CM

## 2025-02-17 RX ORDER — ASPIRIN 81 MG/1
81 TABLET ORAL DAILY
Qty: 90 TABLET | Refills: 0 | Status: SHIPPED | OUTPATIENT
Start: 2025-02-17

## 2025-03-31 ENCOUNTER — TELEPHONE (OUTPATIENT)
Dept: UROLOGY | Facility: CLINIC | Age: 70
End: 2025-03-31
Payer: MEDICARE

## 2025-03-31 NOTE — TELEPHONE ENCOUNTER
Patient called requesting a RX refill for trimix to [facility] pharmacy patient last seen 10/9/24    Review of Systems  All other systems have been reviewed and are negative for complaint.        Assessment and Plan   1) Prostate Cancer  - PSA remains low  - PSA now and in 6 months     2) BPH   - PVR remains low  - Continue with Tamsulosin 0.8 mg daily, refilled today      3) ED  - TriMix increase to  (30/2/20) start with 15 units   - Declines info on penile prosthesis  - No priapism      Follow-up 6 months with labs, or sooner if needed.      All questions and concerns were addressed. Patient verbalizes understanding and has no other questions at this time.   If you have any questions about your care, do not hesitate to call and leave a message, we return calls in a timely manner.     Elena Bundy-- GIANNI MARIA  Office Phone:  [phone]             Electronically signed by COLE Corea at 10/9/2024  2:28 PM

## 2025-04-09 ENCOUNTER — APPOINTMENT (OUTPATIENT)
Dept: UROLOGY | Facility: CLINIC | Age: 70
End: 2025-04-09
Payer: MEDICARE

## 2025-05-12 ENCOUNTER — APPOINTMENT (OUTPATIENT)
Dept: PRIMARY CARE | Facility: CLINIC | Age: 70
End: 2025-05-12
Payer: MEDICARE

## 2025-05-12 VITALS
DIASTOLIC BLOOD PRESSURE: 69 MMHG | WEIGHT: 284 LBS | BODY MASS INDEX: 37.64 KG/M2 | HEIGHT: 73 IN | HEART RATE: 88 BPM | SYSTOLIC BLOOD PRESSURE: 109 MMHG

## 2025-05-12 DIAGNOSIS — I10 BENIGN ESSENTIAL HYPERTENSION: ICD-10-CM

## 2025-05-12 DIAGNOSIS — E55.9 VITAMIN D DEFICIENCY: ICD-10-CM

## 2025-05-12 DIAGNOSIS — T78.40XD ALLERGY, SUBSEQUENT ENCOUNTER: ICD-10-CM

## 2025-05-12 DIAGNOSIS — M54.12 CERVICAL RADICULOPATHY: ICD-10-CM

## 2025-05-12 DIAGNOSIS — E78.5 HYPERLIPIDEMIA, UNSPECIFIED HYPERLIPIDEMIA TYPE: ICD-10-CM

## 2025-05-12 DIAGNOSIS — I25.10 ASCVD (ARTERIOSCLEROTIC CARDIOVASCULAR DISEASE): ICD-10-CM

## 2025-05-12 DIAGNOSIS — N40.1 BPH WITH OBSTRUCTION/LOWER URINARY TRACT SYMPTOMS: ICD-10-CM

## 2025-05-12 DIAGNOSIS — M25.521 RIGHT ELBOW PAIN: Primary | ICD-10-CM

## 2025-05-12 DIAGNOSIS — M54.40 ACUTE RIGHT-SIDED LOW BACK PAIN WITH SCIATICA, SCIATICA LATERALITY UNSPECIFIED: ICD-10-CM

## 2025-05-12 DIAGNOSIS — E11.9 TYPE 2 DIABETES MELLITUS WITHOUT COMPLICATION, WITHOUT LONG-TERM CURRENT USE OF INSULIN: ICD-10-CM

## 2025-05-12 DIAGNOSIS — N13.8 BPH WITH OBSTRUCTION/LOWER URINARY TRACT SYMPTOMS: ICD-10-CM

## 2025-05-12 LAB — POC HEMOGLOBIN A1C: 7.2 % (ref 4.2–6.5)

## 2025-05-12 PROCEDURE — 3008F BODY MASS INDEX DOCD: CPT | Performed by: INTERNAL MEDICINE

## 2025-05-12 PROCEDURE — 3051F HG A1C>EQUAL 7.0%<8.0%: CPT | Performed by: INTERNAL MEDICINE

## 2025-05-12 PROCEDURE — 99214 OFFICE O/P EST MOD 30 MIN: CPT | Performed by: INTERNAL MEDICINE

## 2025-05-12 PROCEDURE — 1036F TOBACCO NON-USER: CPT | Performed by: INTERNAL MEDICINE

## 2025-05-12 PROCEDURE — 3078F DIAST BP <80 MM HG: CPT | Performed by: INTERNAL MEDICINE

## 2025-05-12 PROCEDURE — 83036 HEMOGLOBIN GLYCOSYLATED A1C: CPT | Performed by: INTERNAL MEDICINE

## 2025-05-12 PROCEDURE — 4010F ACE/ARB THERAPY RXD/TAKEN: CPT | Performed by: INTERNAL MEDICINE

## 2025-05-12 PROCEDURE — G2211 COMPLEX E/M VISIT ADD ON: HCPCS | Performed by: INTERNAL MEDICINE

## 2025-05-12 PROCEDURE — 3074F SYST BP LT 130 MM HG: CPT | Performed by: INTERNAL MEDICINE

## 2025-05-12 RX ORDER — LORATADINE 10 MG/1
10 TABLET ORAL DAILY
Qty: 30 TABLET | Refills: 11 | Status: SHIPPED | OUTPATIENT
Start: 2025-05-12 | End: 2026-05-12

## 2025-05-12 RX ORDER — OMEGA-3-ACID ETHYL ESTERS 1 G/1
2 CAPSULE, LIQUID FILLED ORAL 2 TIMES DAILY
Qty: 360 CAPSULE | Refills: 3 | Status: SHIPPED | OUTPATIENT
Start: 2025-05-12

## 2025-05-12 RX ORDER — ACETAMINOPHEN 500 MG
50 TABLET ORAL DAILY
Qty: 90 CAPSULE | Refills: 3 | Status: SHIPPED | OUTPATIENT
Start: 2025-05-12

## 2025-05-12 RX ORDER — DAPAGLIFLOZIN 5 MG/1
5 TABLET, FILM COATED ORAL DAILY
Qty: 90 TABLET | Refills: 1 | Status: SHIPPED | OUTPATIENT
Start: 2025-05-12 | End: 2025-11-08

## 2025-05-12 RX ORDER — GABAPENTIN 100 MG/1
100 CAPSULE ORAL NIGHTLY
Qty: 30 CAPSULE | Refills: 5 | Status: SHIPPED | OUTPATIENT
Start: 2025-05-12 | End: 2025-11-08

## 2025-05-12 RX ORDER — LOSARTAN POTASSIUM 25 MG/1
25 TABLET ORAL DAILY
Qty: 90 TABLET | Refills: 3 | Status: SHIPPED | OUTPATIENT
Start: 2025-05-12

## 2025-05-12 RX ORDER — AZELASTINE 1 MG/ML
2 SPRAY, METERED NASAL 2 TIMES DAILY
Qty: 72 ML | Refills: 0 | Status: SHIPPED | OUTPATIENT
Start: 2025-05-12 | End: 2025-08-10

## 2025-05-12 RX ORDER — NAPROXEN 500 MG/1
500 TABLET ORAL
Qty: 90 TABLET | Refills: 0 | Status: SHIPPED | OUTPATIENT
Start: 2025-05-12

## 2025-05-12 RX ORDER — ASPIRIN 81 MG/1
81 TABLET ORAL DAILY
Qty: 90 TABLET | Refills: 0 | Status: SHIPPED | OUTPATIENT
Start: 2025-05-12

## 2025-05-12 RX ORDER — METFORMIN HYDROCHLORIDE 500 MG/1
500 TABLET, EXTENDED RELEASE ORAL
Qty: 90 TABLET | Refills: 3 | Status: SHIPPED | OUTPATIENT
Start: 2025-05-12

## 2025-05-12 RX ORDER — SIMVASTATIN 20 MG/1
20 TABLET, FILM COATED ORAL DAILY
Qty: 90 TABLET | Refills: 3 | Status: SHIPPED | OUTPATIENT
Start: 2025-05-12

## 2025-05-12 RX ORDER — TAMSULOSIN HYDROCHLORIDE 0.4 MG/1
0.8 CAPSULE ORAL DAILY
Qty: 180 CAPSULE | Refills: 3 | Status: SHIPPED | OUTPATIENT
Start: 2025-05-12

## 2025-05-12 RX ORDER — DICLOFENAC SODIUM 10 MG/G
4 GEL TOPICAL 4 TIMES DAILY
Qty: 100 G | Refills: 1 | Status: SHIPPED | OUTPATIENT
Start: 2025-05-12

## 2025-05-12 ASSESSMENT — ENCOUNTER SYMPTOMS
FEVER: 0
CHEST TIGHTNESS: 0
DYSURIA: 0
PALPITATIONS: 0
ABDOMINAL PAIN: 0
FATIGUE: 0
SHORTNESS OF BREATH: 0
ABDOMINAL DISTENTION: 0
ARTHRALGIAS: 1
SINUS PRESSURE: 1
ACTIVITY CHANGE: 0
DIZZINESS: 0

## 2025-05-12 ASSESSMENT — LIFESTYLE VARIABLES
AUDIT-C TOTAL SCORE: 1
HOW OFTEN DO YOU HAVE SIX OR MORE DRINKS ON ONE OCCASION: NEVER
HOW OFTEN DO YOU HAVE A DRINK CONTAINING ALCOHOL: MONTHLY OR LESS
HOW MANY STANDARD DRINKS CONTAINING ALCOHOL DO YOU HAVE ON A TYPICAL DAY: 1 OR 2
SKIP TO QUESTIONS 9-10: 1

## 2025-05-12 NOTE — PROGRESS NOTES
Primary Care Visit Note    Patient: Brayden George, Age: 69 y.o., SEX: male , MRN:[id number],   PCP: Darlene Booth MD        Resident:  Ang Lisa DO   Preferred Pharmacy:   [facility] - [place], [place] - [address]  [address] [facility] [address]  Phone: [phone] Fax: [phone]    [id number] [place], [place] - [address] AT [facility]  [address]  [address]  [facility] [address]  Phone: [phone] Fax: [phone]    [id number] - [place], [place] - [address] [facility] AT [address] [address]  Phone: [phone] Fax: [phone]          Chief Complaint     Patient: Brayden George is a 69 y.o. male who presented to the clinic for   Chief Complaint   Patient presents with    Follow-up        Subjective      HPI    Brayden George is a 69 y.o. year old male patient with a PMH significant for T2DM, HFpEF,HTN, HLD prostate cancer s/p ADT and radiation therapy in 2020, presents to the clinic for follow-up. The patient is here for a follow up on chronic medical problems. His medications were reviewed, pharmacy updated, notes reviewed, prior labs reviewed.  Patient has been endorsing sinus congestion since the seasons have changed.  Denies fever or additional flulike symptoms. Patient is also endorsing right arm pain at his elbow that hurts with movement. He had previously treated this pain with Voltaren gel.  BP is well-controlled, 109/69 in the clinic today.  No additional concerns at this time, patient would like all his medications refilled.  ROS   Review of Systems   Constitutional:  Negative for activity change, fatigue and fever.   HENT:  Positive for congestion and sinus pressure.    Respiratory:  Negative for chest tightness and shortness of breath.    Cardiovascular:  Negative for chest pain and palpitations.   Gastrointestinal:  Negative for abdominal distention and abdominal  pain.   Genitourinary:  Negative for dysuria.   Musculoskeletal:  Positive for arthralgias.   Skin:  Negative for pallor.   Neurological:  Negative for dizziness.      Past History     PMHx:    has a past medical history of Acute prostatitis (04/06/2013), Acute sinusitis, unspecified (02/21/2013), Body mass index (BMI) 37.0-37.9, adult (07/03/2019), Body mass index (BMI) 38.0-38.9, adult (10/01/2021), Body mass index (BMI) 39.0-39.9, adult (04/04/2022), Body mass index (BMI)40.0-44.9, adult (01/05/2021), Personal history of other diseases of the respiratory system (02/21/2013), Personal history of other endocrine, nutritional and metabolic disease (09/05/2019), Personal history of other endocrine, nutritional and metabolic disease (04/04/2022), Personal history of other specified conditions (08/21/2014), and Personal history of other specified conditions (11/21/2014).   Allergies:   Allergies[1]   Surgical Hx:   Surgical History[2]   Social HX:   Social History[3]   MEDS:   Current Outpatient Medications   Medication Instructions    aspirin 81 mg, oral, Daily    azelastine (Astelin) 137 mcg (0.1 %) nasal spray 2 sprays, Each Nostril, 2 times daily    cholecalciferol (VITAMIN D-3) 50 mcg, oral, Daily    dapagliflozin propanediol (FARXIGA) 5 mg, oral, Daily    diclofenac sodium (VOLTAREN) 4 g, Topical, 4 times daily    gabapentin (NEURONTIN) 100 mg, oral, Nightly    loratadine (CLARITIN) 10 mg, oral, Daily    losartan (COZAAR) 25 mg, oral, Daily    metFORMIN XR (GLUCOPHAGE-XR) 500 mg, oral, Daily with evening meal, Do not crush, chew, or split.    naproxen (NAPROSYN) 500 mg, oral, Daily RT    omega-3 acid ethyl esters (LOVAZA) 2 g, oral, 2 times daily    simvastatin (ZOCOR) 20 mg, oral, Daily    tamsulosin (FLOMAX) 0.8 mg, oral, Daily      Objective      Visit Vitals  /69 (BP Location: Right arm, Patient Position: Sitting, BP Cuff Size: Large adult)   Pulse 88      BMI Readings from Last 15 Encounters:    05/12/25 37.47 kg/m²   01/20/25 37.60 kg/m²   09/13/24 38.00 kg/m²   07/09/24 35.95 kg/m²   06/06/24 37.62 kg/m²   04/17/24 37.23 kg/m²   03/14/24 37.23 kg/m²   03/13/24 38.38 kg/m²   11/29/23 38.38 kg/m²   09/15/23 38.65 kg/m²   08/10/23 37.87 kg/m²   06/28/23 37.87 kg/m²   06/17/23 37.65 kg/m²   04/28/23 37.64 kg/m²   01/27/23 38.04 kg/m²      Lab Results   Component Value Date    HGBA1C 7.2 (A) 05/12/2025      Lab Results   Component Value Date    HGBA1C 7.2 (A) 05/12/2025    HGBA1C 7.0 (H) 01/20/2025    HGBA1C 7.6 (A) 09/13/2024     Lab Results   Component Value Date    LDLCALC 62 09/13/2024    CREATININE 0.91 01/20/2025      Lab Results   Component Value Date    WBC 5.0 01/20/2025    HGB 13.6 01/20/2025    HCT 40.4 (L) 01/20/2025    MCV 87 01/20/2025     01/20/2025        Chemistry    Lab Results   Component Value Date/Time     01/20/2025 1450    K 4.4 01/20/2025 1450     01/20/2025 1450    CO2 31 01/20/2025 1450    BUN 13 01/20/2025 1450    CREATININE 0.91 01/20/2025 1450    Lab Results   Component Value Date/Time    CALCIUM 10.0 01/20/2025 1450    ALKPHOS 63 01/20/2025 1450    AST 15 01/20/2025 1450    ALT 19 01/20/2025 1450    BILITOT 0.5 01/20/2025 1450             Physical Exam  Physical Exam  Constitutional:       General: He is not in acute distress.  HENT:      Head: Normocephalic.      Mouth/Throat:      Mouth: Mucous membranes are moist.   Eyes:      Extraocular Movements: Extraocular movements intact.   Cardiovascular:      Rate and Rhythm: Normal rate and regular rhythm.      Pulses: Normal pulses.      Heart sounds: Normal heart sounds.   Pulmonary:      Effort: Pulmonary effort is normal.      Breath sounds: Normal breath sounds.   Abdominal:      General: There is no distension.      Tenderness: There is no abdominal tenderness. There is no guarding.   Musculoskeletal:         General: Tenderness present.      Right lower leg: No edema.      Left lower leg: No edema.       Comments: Tenderness at right elbow with movement   Neurological:      Mental Status: He is alert and oriented to person, place, and time. Mental status is at baseline.        Assessment and Plan     Assessment/Plan    The following medical issues were discussed during this encounter  :       Brayden was seen today for follow-up.  Diagnoses and all orders for this visit:  Right elbow pain (Primary)  Patient endorses popping sound with elbow movement and intermittent pain with movement  Pain had been controlled with voltaren gel in the past  Likely arthritic pain with resulting crepitus  Will reorder voltaren gel  -     diclofenac sodium (Voltaren) 1 % gel; Apply 4.5 inches (4 g) topically 4 times a day.  ASCVD (arteriosclerotic cardiovascular disease)  Refilled aspirin  -     aspirin 81 mg EC tablet; Take 1 tablet (81 mg) by mouth once daily.  Allergy, subsequent encounter  Congestion symptoms due to seasonal allergies  Patient has not received his Claritin  Refilled Astelin and Claritin, patient may use over the counter if unable to receive  -     azelastine (Astelin) 137 mcg (0.1 %) nasal spray; Administer 2 sprays into each nostril 2 times a day.  -     loratadine (Claritin) 10 mg tablet; Take 1 tablet (10 mg) by mouth once daily.  Vitamin D deficiency  -     cholecalciferol (Vitamin D-3) 50 mcg (2,000 units) capsule; Take 1 capsule (50 mcg) by mouth once daily.  Type 2 diabetes mellitus without complication, without long-term current use of insulin  A1c of 7.2 in the office today  Refilled patient's Farxiga and Metformin  Urine ACR pending  Recommended patient schedule an appointment with ophthalmologist  -     dapagliflozin propanediol (Farxiga) 5 mg tablet; Take 1 tablet (5 mg) by mouth once daily.  -     metFORMIN  mg 24 hr tablet; Take 1 tablet (500 mg) by mouth once daily in the evening. Take with meals. Do not crush, chew, or split.  -     Albumin-Creatinine Ratio, Urine Random; Future  -      Albumin-Creatinine Ratio, Urine Random  -     POCT glycosylated hemoglobin (Hb A1C) manually resulted  Cervical radiculopathy  Refilled Gabapentin  -     gabapentin (Neurontin) 100 mg capsule; Take 1 capsule (100 mg) by mouth once daily at bedtime.  Benign essential hypertension  /64  Well-Controlled  Continue current regiment  Daily BP checks at home  -     losartan (Cozaar) 25 mg tablet; Take 1 tablet (25 mg) by mouth once daily.  -     Albumin-Creatinine Ratio, Urine Random; Future  -     Albumin-Creatinine Ratio, Urine Random    BPH with obstruction/lower urinary tract symptoms  Refilled patient's Flomac  -     tamsulosin (Flomax) 0.4 mg 24 hr capsule; Take 2 capsules (0.8 mg) by mouth once daily.    Hyperlipidemia, unspecified hyperlipidemia type  The 10-year ASCVD risk score (Mike HERNANDEZ, et al., 2019) is: 22.3%    Values used to calculate the score:      Age: 69 years      Sex: Male      Is Non- : Yes      Diabetic: Yes      Tobacco smoker: No      Systolic Blood Pressure: 109 mmHg      Is BP treated: Yes      HDL Cholesterol: 58.3 mg/dL      Total Cholesterol: 140 mg/dL   Refilled patient's Zocor and Lovaza  Lipid panel ordered  -     simvastatin (Zocor) 20 mg tablet; Take 1 tablet (20 mg) by mouth once daily.  -     omega-3 acid ethyl esters (Lovaza) 1 gram capsule; Take 2 capsules (2 g) by mouth 2 times a day.  -     Lipid panel; Future  -     Lipid panel  Acute right-sided low back pain with sciatica, sciatica laterality unspecified  -     naproxen (Naprosyn) 500 mg tablet; Take 1 tablet (500 mg) by mouth once daily.         Health maintenance  The following screening tests were ordered:Urine ACR, Lipid Panel    Immunizations: Recommend patient receive Pneumoccocal vaccine at local pharmacy    Please return in 3 months or if symptoms worsen     Ang Lisa, DO  Internal Medicine, PGY- 1  05/12/25 at 4:13 PM         [1]   Allergies  Allergen Reactions    Ace Inhibitors  Unknown   [2]   Past Surgical History:  Procedure Laterality Date    COLONOSCOPY  05/23/2013    Complete Colonoscopy   [3]   Social History  Tobacco Use    Smoking status: Never     Passive exposure: Past    Smokeless tobacco: Never   Substance Use Topics    Alcohol use: Yes     Alcohol/week: 1.0 standard drink of alcohol     Types: 1 Shots of liquor per week    Drug use: Never

## 2025-06-09 ENCOUNTER — LAB (OUTPATIENT)
Dept: LAB | Facility: HOSPITAL | Age: 70
End: 2025-06-09
Payer: MEDICARE

## 2025-06-09 ENCOUNTER — OFFICE VISIT (OUTPATIENT)
Dept: UROLOGY | Facility: HOSPITAL | Age: 70
End: 2025-06-09
Payer: MEDICARE

## 2025-06-09 VITALS
DIASTOLIC BLOOD PRESSURE: 67 MMHG | OXYGEN SATURATION: 99 % | BODY MASS INDEX: 38.28 KG/M2 | HEART RATE: 96 BPM | RESPIRATION RATE: 20 BRPM | WEIGHT: 290.13 LBS | SYSTOLIC BLOOD PRESSURE: 140 MMHG | TEMPERATURE: 98.2 F

## 2025-06-09 DIAGNOSIS — C61 CANCER OF PROSTATE (MULTI): Primary | ICD-10-CM

## 2025-06-09 DIAGNOSIS — N40.1 BPH WITH OBSTRUCTION/LOWER URINARY TRACT SYMPTOMS: ICD-10-CM

## 2025-06-09 DIAGNOSIS — C61 CANCER OF PROSTATE (MULTI): ICD-10-CM

## 2025-06-09 DIAGNOSIS — N52.35 ERECTILE DYSFUNCTION FOLLOWING RADIATION THERAPY: ICD-10-CM

## 2025-06-09 DIAGNOSIS — N13.8 BPH WITH OBSTRUCTION/LOWER URINARY TRACT SYMPTOMS: ICD-10-CM

## 2025-06-09 LAB — PSA SERPL-MCNC: 0.11 NG/ML

## 2025-06-09 PROCEDURE — 36415 COLL VENOUS BLD VENIPUNCTURE: CPT

## 2025-06-09 PROCEDURE — 3078F DIAST BP <80 MM HG: CPT | Performed by: NURSE PRACTITIONER

## 2025-06-09 PROCEDURE — 1160F RVW MEDS BY RX/DR IN RCRD: CPT | Performed by: NURSE PRACTITIONER

## 2025-06-09 PROCEDURE — G2211 COMPLEX E/M VISIT ADD ON: HCPCS | Performed by: NURSE PRACTITIONER

## 2025-06-09 PROCEDURE — 99213 OFFICE O/P EST LOW 20 MIN: CPT | Performed by: NURSE PRACTITIONER

## 2025-06-09 PROCEDURE — 1036F TOBACCO NON-USER: CPT | Performed by: NURSE PRACTITIONER

## 2025-06-09 PROCEDURE — 1159F MED LIST DOCD IN RCRD: CPT | Performed by: NURSE PRACTITIONER

## 2025-06-09 PROCEDURE — 3051F HG A1C>EQUAL 7.0%<8.0%: CPT | Performed by: NURSE PRACTITIONER

## 2025-06-09 PROCEDURE — 84153 ASSAY OF PSA TOTAL: CPT

## 2025-06-09 PROCEDURE — 3077F SYST BP >= 140 MM HG: CPT | Performed by: NURSE PRACTITIONER

## 2025-06-09 PROCEDURE — 1126F AMNT PAIN NOTED NONE PRSNT: CPT | Performed by: NURSE PRACTITIONER

## 2025-06-09 PROCEDURE — 4010F ACE/ARB THERAPY RXD/TAKEN: CPT | Performed by: NURSE PRACTITIONER

## 2025-06-09 ASSESSMENT — PAIN SCALES - GENERAL: PAINLEVEL_OUTOF10: 0-NO PAIN

## 2025-06-09 NOTE — PROGRESS NOTES
[facility]  Outpatient Clinic Note    Subjective   Brayden George is a 69 y.o. male 6 month FUV     History of Present Illness   Patient with history of 3+4=7 prostate cancer s/p ADT in 2020 and radiation therapy completed in July 2020. He reports he is voiding well and continues to take Tamsulosin 0.8 mg daily. No response with Tadalafil or Sildenafil. He continues to use TriMix increased to 30/3/20 last visit using up to 50 units. Fair results, he would like to discuss increasing dose.  Drinking powerade, water,~2L daily   He denies any dysuria, gross hematuria, flank pain, fevers or chills.   Taking Morninga OTC Prostate supplement       Lab Results   Component Value Date    PSA 0.17 03/18/2024    PSA 0.35 09/14/2023    PSA 0.53 03/09/2023    PSA 0.32 09/12/2022    PSA 0.30 03/21/2022    PSA 0.29 12/22/2021    PSA 0.66 10/11/2021    PSA 0.79 07/13/2021    PSA 0.55 04/13/2021    PSA 0.67 09/29/2020       Past Medical History and Surgical History   Past Medical History:   Diagnosis Date    Acute prostatitis 04/06/2013    Acute bacterial prostatitis    Acute sinusitis, unspecified 02/21/2013    Acute sinusitis    Body mass index (BMI) 37.0-37.9, adult 07/03/2019    BMI 37.0-37.9, adult    Body mass index (BMI) 38.0-38.9, adult 10/01/2021    BMI 38.0-38.9,adult    Body mass index (BMI) 39.0-39.9, adult 04/04/2022    Body mass index (BMI) of 39.0 to 39.9 in adult    Body mass index (BMI)40.0-44.9, adult 01/05/2021    Body mass index (BMI) of 40.0 to 44.9 in adult    Personal history of other diseases of the respiratory system 02/21/2013    History of upper respiratory infection    Personal history of other endocrine, nutritional and metabolic disease 09/05/2019    History of obesity    Personal history of other endocrine, nutritional and metabolic disease 04/04/2022    History of morbid obesity    Personal history of other specified conditions 08/21/2014    History of chest pain    Personal history of other  specified conditions 11/21/2014    History of diarrhea     Past Surgical History:   Procedure Laterality Date    COLONOSCOPY  05/23/2013    Complete Colonoscopy       Medications  Current Outpatient Medications on File Prior to Visit   Medication Sig Dispense Refill    aspirin 81 mg EC tablet Take 1 tablet (81 mg) by mouth once daily. 90 tablet 0    azelastine (Astelin) 137 mcg (0.1 %) nasal spray Administer 2 sprays into each nostril 2 times a day. 72 mL 0    cholecalciferol (Vitamin D-3) 50 mcg (2,000 units) capsule Take 1 capsule (50 mcg) by mouth once daily. 90 capsule 3    dapagliflozin propanediol (Farxiga) 5 mg tablet Take 1 tablet (5 mg) by mouth once daily. 90 tablet 1    diclofenac sodium (Voltaren) 1 % gel Apply 4.5 inches (4 g) topically 4 times a day. 100 g 1    gabapentin (Neurontin) 100 mg capsule Take 1 capsule (100 mg) by mouth once daily at bedtime. 30 capsule 5    loratadine (Claritin) 10 mg tablet Take 1 tablet (10 mg) by mouth once daily. 30 tablet 11    losartan (Cozaar) 25 mg tablet Take 1 tablet (25 mg) by mouth once daily. 90 tablet 3    metFORMIN  mg 24 hr tablet Take 1 tablet (500 mg) by mouth once daily in the evening. Take with meals. Do not crush, chew, or split. 90 tablet 3    naproxen (Naprosyn) 500 mg tablet Take 1 tablet (500 mg) by mouth once daily. 90 tablet 0    omega-3 acid ethyl esters (Lovaza) 1 gram capsule Take 2 capsules (2 g) by mouth 2 times a day. 360 capsule 3    simvastatin (Zocor) 20 mg tablet Take 1 tablet (20 mg) by mouth once daily. 90 tablet 3    tamsulosin (Flomax) 0.4 mg 24 hr capsule Take 2 capsules (0.8 mg) by mouth once daily. 180 capsule 3     No current facility-administered medications on file prior to visit.       Objective   Physicial Exam  General: Well developed, well nourished, alert and cooperative, appears in no acute distress  Eyes: Non-injected conjunctiva, sclera clear, no proptosis  Cardiac: Extremities are warm and well perfused. No  edema, cyanosis or pallor.   Lungs: Breathing is easy, non-labored. Speaking in clear and complete sentences. Normal diaphragmatic movement.  MSK: Ambulatory with steady gait, unassisted  Neuro: alert and oriented to person, place and time  Psych: Demonstrates good judgement and reason, without hallucinations, abnormal affect or abnormal behaviors.  Skin: no obvious lesions, no rashes.    Review of Systems  All other systems have been reviewed and are negative for complaint.      Assessment and Plan   1) Prostate Cancer  - PSA remains low  - PSA now and in 6 months     2) BPH   - Continue with Tamsulosin 0.8 mg daily     3) ED  - TriMix increase to 30/3/50 start with 15 units, explained   - Declines info on penile prosthesis  - No priapism      Follow-up 6 months with labs, or sooner if needed.     All questions and concerns were addressed. Patient verbalizes understanding and has no other questions at this time.   If you have any questions about your care, do not hesitate to call and leave a message, we return calls in a timely manner.    Elena Bundy-- GIANNI MARIA  Office Phone:  [phone]

## 2025-07-22 ENCOUNTER — DOCUMENTATION (OUTPATIENT)
Dept: UROLOGY | Facility: CLINIC | Age: 70
End: 2025-07-22
Payer: MEDICARE

## 2025-07-22 NOTE — PROGRESS NOTES
Returned patient call regarding current Trimix dosing not effective. We spoke about increasing mix, discussed titrating dose. He understands and wishes to continue with [facility]   Called Rx into [facility] 30/3/50

## 2025-08-12 DIAGNOSIS — I25.10 ASCVD (ARTERIOSCLEROTIC CARDIOVASCULAR DISEASE): ICD-10-CM

## 2025-08-12 RX ORDER — ASPIRIN 81 MG/1
81 TABLET ORAL DAILY
Qty: 90 TABLET | Refills: 0 | Status: SHIPPED | OUTPATIENT
Start: 2025-08-12

## 2025-08-15 DIAGNOSIS — M25.521 RIGHT ELBOW PAIN: ICD-10-CM

## 2025-08-15 RX ORDER — DICLOFENAC SODIUM 10 MG/G
4 GEL TOPICAL 4 TIMES DAILY
Qty: 100 G | Refills: 1 | Status: SHIPPED | OUTPATIENT
Start: 2025-08-15

## 2025-09-03 ENCOUNTER — APPOINTMENT (OUTPATIENT)
Dept: PRIMARY CARE | Facility: CLINIC | Age: 70
End: 2025-09-03
Payer: MEDICARE

## 2025-09-03 ASSESSMENT — ENCOUNTER SYMPTOMS
FEVER: 0
NAUSEA: 0
SINUS PRESSURE: 1
CHEST TIGHTNESS: 0
FATIGUE: 0
RHINORRHEA: 1
VOMITING: 0
DIZZINESS: 0
APPETITE CHANGE: 0
SHORTNESS OF BREATH: 0
PALPITATIONS: 0
LIGHT-HEADEDNESS: 0
DIARRHEA: 1

## 2025-09-03 ASSESSMENT — LIFESTYLE VARIABLES
HOW OFTEN DO YOU HAVE A DRINK CONTAINING ALCOHOL: MONTHLY OR LESS
HOW MANY STANDARD DRINKS CONTAINING ALCOHOL DO YOU HAVE ON A TYPICAL DAY: 1 OR 2
HOW OFTEN DO YOU HAVE SIX OR MORE DRINKS ON ONE OCCASION: NEVER
SKIP TO QUESTIONS 9-10: 1
AUDIT-C TOTAL SCORE: 1

## 2025-12-22 ENCOUNTER — APPOINTMENT (OUTPATIENT)
Dept: PRIMARY CARE | Facility: CLINIC | Age: 70
End: 2025-12-22
Payer: MEDICARE